# Patient Record
Sex: FEMALE | Race: WHITE | Employment: FULL TIME | URBAN - METROPOLITAN AREA
[De-identification: names, ages, dates, MRNs, and addresses within clinical notes are randomized per-mention and may not be internally consistent; named-entity substitution may affect disease eponyms.]

---

## 2018-11-16 ENCOUNTER — TELEPHONE (OUTPATIENT)
Dept: PHARMACY | Facility: CLINIC | Age: 68
End: 2018-11-16

## 2018-11-16 NOTE — TELEPHONE ENCOUNTER
CLINICAL PHARMACY NOTE - Adherence Review    Identified adherence care gap per [employer] (patient insurance) - per records, appears:   Pravastatin 40mg -  90-day supply last filled 8/1/18    Notes:   Per Reconcile Medication Dispenses in Care Path:   Last filled on 10/18/18 for a 90-day supply    [facility] [address] [facility]:  Last filled/billed on 10/18/18 for a 90-day supply; last picked up on 10/19/18 for a 90ds    No patient outreach planned at this time. Giovanni Calderon  PharmD Candidate 8728 [phone]    I have discussed the care of this patient with the student. I agree with the assessment and plan as documented. Bhaskar McdowellD, Shasta Robles  Clinical Pharmacy Specialist  O: [phone]  C: [address] [place] [phone], Option 7    =======================================================    For Pharmacy Admin Tracking Only  PHSO: Yes  Total # of Interventions Recommended: 1  - New Order #: 0 New Medication Order Reason(s):  Adherence  - Refills Provided #: 0  - Maintenance Safety Lab Monitoring #: 1  - New Therapy Lab Monitoring #: 0  Total Interventions Accepted: 0  Time Spent (min): 15

## 2019-11-27 PROBLEM — C50.911 MALIGNANT NEOPLASM OF RIGHT FEMALE BREAST (HCC): Status: ACTIVE | Noted: 2019-11-27

## 2020-03-10 LAB
ABSOLUTE BASO #: 0 X10E9/L (ref 0–0.9)
ABSOLUTE EOS #: 0.1 X10E9/L (ref 0–0.4)
ABSOLUTE LYMPH #: 1.5 X10E9/L (ref 1–3.5)
ABSOLUTE MONO #: 0.5 X10E9/L (ref 0–0.9)
ABSOLUTE NEUT #: 4 X10E9/L (ref 1.5–6.6)
ALBUMIN SERPL-MCNC: 4 G/DL (ref 3.2–5.3)
ALK PHOSPHATASE: 76 U/L (ref 39–130)
ALT SERPL-CCNC: 8 U/L (ref 0–31)
ANION GAP SERPL CALCULATED.3IONS-SCNC: 8 MMOL/L (ref 5–15)
AST SERPL-CCNC: 10 U/L (ref 0–41)
BASOPHILS RELATIVE PERCENT: 0.4 %
BILIRUB SERPL-MCNC: 0.5 MG/DL (ref 0.3–1.2)
BUN BLDV-MCNC: 9 MG/DL (ref 5–27)
CALCIUM SERPL-MCNC: 9 MG/DL (ref 8.5–10.5)
CHLORIDE BLD-SCNC: 102 MMOL/L (ref 98–109)
CO2: 28 MMOL/L (ref 22–32)
CREAT SERPL-MCNC: 0.74 MG/DL (ref 0.4–1)
EGFR AFRICAN AMERICAN: >60 ML/MIN/1.73SQ.M
EGFR IF NONAFRICAN AMERICAN: >60 ML/MIN/1.73SQ.M
EOSINOPHILS RELATIVE PERCENT: 2 %
GLUCOSE: 271 MG/DL (ref 65–99)
HCT VFR BLD CALC: 35.9 % (ref 35–47)
HEMOGLOBIN: 11.9 G/DL (ref 11.7–16.1)
LYMPHOCYTE %: 24.6 %
MCH RBC QN AUTO: 27.7 PG (ref 27–35)
MCHC RBC AUTO-ENTMCNC: 33.1 G/DL (ref 31–36)
MCV RBC AUTO: 84 FL (ref 81–101)
MONOCYTES # BLD: 8.5 %
NEUTROPHILS RELATIVE PERCENT: 64.5 %
PDW BLD-RTO: 15.8 % (ref 11.5–14.7)
PLATELETS: 283 X10E9/L (ref 150–450)
PMV BLD AUTO: 7.1 FL (ref 7–12)
POTASSIUM SERPL-SCNC: 3.6 MMOL/L (ref 3.5–5)
RBC: 4.29 X10E12/L (ref 3.55–5.2)
SODIUM BLD-SCNC: 138 MMOL/L (ref 134–146)
TOTAL PROTEIN: 6.3 G/DL (ref 6–8)
WBC: 6.2 X10E9/L (ref 4–11.8)

## 2020-10-01 PROBLEM — E11.649 TYPE 2 DIABETES MELLITUS WITH HYPOGLYCEMIA WITHOUT COMA, WITHOUT LONG-TERM CURRENT USE OF INSULIN (HCC): Status: ACTIVE | Noted: 2020-10-01

## 2020-10-01 PROBLEM — F33.42 RECURRENT MAJOR DEPRESSIVE DISORDER, IN FULL REMISSION (HCC): Status: ACTIVE | Noted: 2020-10-01

## 2021-03-30 ENCOUNTER — TELEPHONE (OUTPATIENT)
Dept: PHARMACY | Facility: CLINIC | Age: 71
End: 2021-03-30

## 2021-03-30 NOTE — LETTER
[address]  [address], [address]  Phone: [phone], option 7        Ema Byrne   [address] [facility] [address]           03/31/21     Dear Ema Byrne,    We tried to reach you recently regarding your Pravastatin and Glimperide, but were unable to reach you on the telephone. We have on file that you are currently taking Pravastatin and Glimperide. If you are no longer taking it or taking it differently than above, please call us at the number below so that we can discuss this and update your medication profile.      This medication can be filled for a 3-month supply to save you time and trips to the pharmacy  if you would like assistance in switching your prescriptions to a 3-month supply, please contact us        Sincerely,      [address]  Phone: [phone], option 7

## 2021-03-30 NOTE — TELEPHONE ENCOUNTER
[facility] CLINICAL PHARMACY REVIEW: ADHERENCE REVIEW  Identified care gap per [employer]; fills at [address]: Diabetes and Statin adherence    Last Visit: 3/31/21    Patient also appears to be prescribed: Jardiance 25mg, Glimepiride 2mg, Pravastatin 40mg,     Patient found in Outcomes MTM and is currently eligible for TIP    DIABETES ADHERENCE    Per Insurance Records through [address]    Per Outcomes MTM Records:  Sally Ellington last filled on 3/18/21 for 90 day supply. Per [address]:   Sally Ellington last picked up on 3/18/21 for 90 day supply. 3/22/21 refills remaining. Billed through [place] through [place] ([address] [facility] = 97%; YTD [facility] = 88%):   Jardiance last filled on 1/25/21 for 30 day supply. Next refill due: 3/31/21    Per Outcomes MTM Records:  [place] last filled on 3/1/21 for 30 day supply. Per [address]:   [place] last picked up on 3/1/21 for 30 day supply. 5 refills remaining. Billed through [employer]   Component Value Date    LABA1C 7.5 03/29/2021    LABA1C 7.4 (H) 10/01/2020    LABA1C 7.5 (H) 10/31/2018     NOTE A1c <9%    STATIN ADHERENCE    Per Insurance Records through [place] ([address] [facility] = 60%; YTD PDC = 100%):   Pravastatin last filled on 1/25/21 for 30 day supply. Next refill due: 3/31/21    Per Outcomes MTM Records:  Pravastatin last filled on 3/1/21 for 90 day supply. Per [address]:   Pravastatin last picked up on 3/1/21 for 30 day supply. 0 refills remaining.  Billed through [employer]   Component Value Date    CHOL 206 03/29/2021    TRIG 141 03/29/2021    HDL 40 03/29/2021    LDLCALC 138 03/29/2021     ALT   Date Value Ref Range Status   10/01/2020 10 U/L Final     AST   Date Value Ref Range Status   10/01/2020 15 U/L Final     The 10-year ASCVD risk score (Tiffanie Lazaro., et al., 2013) is: 20.6%    Values used to calculate the score:      Age: 72 years      Sex: Female      Is Non- : No      Diabetic: Yes      Tobacco smoker: No      Systolic Blood Pressure: 661 mmHg      Is BP treated: Yes      HDL Cholesterol: 40 mg/dL      Total Cholesterol: 206 mg/dL     PLAN  The following are interventions that have been identified:   - Patient eligible for 90 day supply of Jaridance    Attempting to reach patient to review changing prescription from a 30-day supply to a 90-day supply.  Left message asking for return call       Future Appointments   Date Time Provider July Velarde   7/29/2021  9:00 AM Marshall Gupta MD [facility] None

## 2021-04-01 NOTE — TELEPHONE ENCOUNTER
Tiffanie Tejas returned call and declined a 90ds for Jardiance. She said \"financially its better to fill once a month\". If a 90ds is cheaper, she'll consider switching to a 90ds      CLINICAL PHARMACY CONSULT: MED RECONCILIATION/REVIEW ADDENDUM    For Pharmacy Admin Tracking Only    PHSO: Yes  Total # of Interventions Recommended: 0  - New Order #: 0 New Medication Order Reason(s):  Adherence  - Maintenance Safety Lab Monitoring #: 1  - New Therapy Lab Monitoring #: 0  Recommended intervention potential cost savings: 0  Total Interventions Accepted: 0  Time Spent (min): Hernando Zimmerman

## 2021-08-19 ENCOUNTER — HOSPITAL ENCOUNTER (EMERGENCY)
Age: 71
Discharge: HOME | End: 2021-08-19
Attending: EMERGENCY MEDICINE
Payer: COMMERCIAL

## 2021-08-19 ENCOUNTER — APPOINTMENT (OUTPATIENT)
Dept: ULTRASOUND IMAGING | Age: 71
End: 2021-08-19
Payer: COMMERCIAL

## 2021-08-19 VITALS
HEART RATE: 80 BPM | SYSTOLIC BLOOD PRESSURE: 131 MMHG | DIASTOLIC BLOOD PRESSURE: 65 MMHG | BODY MASS INDEX: 30.99 KG/M2 | TEMPERATURE: 98.5 F | HEIGHT: 65 IN | WEIGHT: 186 LBS | RESPIRATION RATE: 18 BRPM | OXYGEN SATURATION: 98 %

## 2021-08-19 DIAGNOSIS — M79.89 LEFT LEG SWELLING: Primary | ICD-10-CM

## 2021-08-19 PROCEDURE — 99283 EMERGENCY DEPT VISIT LOW MDM: CPT

## 2021-08-19 PROCEDURE — 93971 EXTREMITY STUDY: CPT

## 2022-01-17 ENCOUNTER — HOSPITAL ENCOUNTER (OUTPATIENT)
Age: 72
Setting detail: SPECIMEN
Discharge: HOME | End: 2022-01-17

## 2022-01-17 ENCOUNTER — HOSPITAL ENCOUNTER (OUTPATIENT)
Dept: GENERAL RADIOLOGY | Facility: CLINIC | Age: 72
Discharge: HOME | End: 2022-01-19
Payer: COMMERCIAL

## 2022-01-17 DIAGNOSIS — E11.9 TYPE 2 DIABETES MELLITUS WITHOUT COMPLICATION, WITHOUT LONG-TERM CURRENT USE OF INSULIN (HCC): ICD-10-CM

## 2022-01-17 DIAGNOSIS — R05.9 COUGH: ICD-10-CM

## 2022-01-17 DIAGNOSIS — R53.83 OTHER FATIGUE: ICD-10-CM

## 2022-01-17 DIAGNOSIS — E78.5 HYPERLIPIDEMIA, UNSPECIFIED HYPERLIPIDEMIA TYPE: ICD-10-CM

## 2022-01-17 LAB
ALT SERPL-CCNC: 5 U/L (ref 5–33)
ANION GAP SERPL CALCULATED.3IONS-SCNC: 15 MMOL/L (ref 9–17)
AST SERPL-CCNC: 10 U/L
BUN BLDV-MCNC: 10 MG/DL (ref 8–23)
BUN/CREAT BLD: ABNORMAL (ref 9–20)
CALCIUM SERPL-MCNC: 9.1 MG/DL (ref 8.6–10.4)
CHLORIDE BLD-SCNC: 101 MMOL/L (ref 98–107)
CHOLESTEROL/HDL RATIO: 4.2
CHOLESTEROL: 171 MG/DL
CO2: 24 MMOL/L (ref 20–31)
CREAT SERPL-MCNC: 0.49 MG/DL (ref 0.5–0.9)
GFR AFRICAN AMERICAN: >60 ML/MIN
GFR NON-AFRICAN AMERICAN: >60 ML/MIN
GFR SERPL CREATININE-BSD FRML MDRD: ABNORMAL ML/MIN/{1.73_M2}
GFR SERPL CREATININE-BSD FRML MDRD: ABNORMAL ML/MIN/{1.73_M2}
GLUCOSE FASTING: 98 MG/DL (ref 70–99)
HDLC SERPL-MCNC: 41 MG/DL
LDL CHOLESTEROL: 107 MG/DL (ref 0–130)
POTASSIUM SERPL-SCNC: 3.8 MMOL/L (ref 3.7–5.3)
SODIUM BLD-SCNC: 140 MMOL/L (ref 135–144)
THYROXINE, FREE: 1.15 NG/DL (ref 0.93–1.7)
TRIGL SERPL-MCNC: 114 MG/DL
TSH SERPL DL<=0.05 MIU/L-ACNC: 3.13 MIU/L (ref 0.3–5)
VITAMIN B-12: 222 PG/ML (ref 232–1245)
VLDLC SERPL CALC-MCNC: NORMAL MG/DL (ref 1–30)

## 2022-01-17 PROCEDURE — 71046 X-RAY EXAM CHEST 2 VIEWS: CPT

## 2022-01-18 LAB
ABSOLUTE EOS #: 0.16 K/UL (ref 0–0.44)
ABSOLUTE IMMATURE GRANULOCYTE: 0.08 K/UL (ref 0–0.3)
ABSOLUTE LYMPH #: 1.42 K/UL (ref 1.1–3.7)
ABSOLUTE MONO #: 0.79 K/UL (ref 0.1–1.2)
BASOPHILS # BLD: 1 % (ref 0–2)
BASOPHILS ABSOLUTE: 0.08 K/UL (ref 0–0.2)
DIFFERENTIAL TYPE: ABNORMAL
EOSINOPHILS RELATIVE PERCENT: 2 % (ref 1–4)
ESTIMATED AVERAGE GLUCOSE: 123 MG/DL
HBA1C MFR BLD: 5.9 % (ref 4–6)
HCT VFR BLD CALC: 29.2 % (ref 36.3–47.1)
HEMOGLOBIN: 8.2 G/DL (ref 11.9–15.1)
IMMATURE GRANULOCYTES: 1 %
LYMPHOCYTES # BLD: 18 % (ref 24–43)
MCH RBC QN AUTO: 24.3 PG (ref 25.2–33.5)
MCHC RBC AUTO-ENTMCNC: 28.1 G/DL (ref 28.4–34.8)
MCV RBC AUTO: 86.4 FL (ref 82.6–102.9)
MONOCYTES # BLD: 10 % (ref 3–12)
MORPHOLOGY: ABNORMAL
NRBC AUTOMATED: 0 PER 100 WBC
PDW BLD-RTO: 18.2 % (ref 11.8–14.4)
PLATELET # BLD: 453 K/UL (ref 138–453)
PLATELET ESTIMATE: ABNORMAL
PMV BLD AUTO: 8.9 FL (ref 8.1–13.5)
RBC # BLD: 3.38 M/UL (ref 3.95–5.11)
RBC # BLD: ABNORMAL 10*6/UL
SEG NEUTROPHILS: 68 % (ref 36–65)
SEGMENTED NEUTROPHILS ABSOLUTE COUNT: 5.37 K/UL (ref 1.5–8.1)
WBC # BLD: 7.9 K/UL (ref 3.5–11.3)
WBC # BLD: ABNORMAL 10*3/UL

## 2022-08-30 ENCOUNTER — TELEPHONE (OUTPATIENT)
Dept: PHARMACY | Facility: CLINIC | Age: 72
End: 2022-08-30

## 2022-08-30 NOTE — TELEPHONE ENCOUNTER
[facility] CLINICAL PHARMACY: ADHERENCE REVIEW  Identified care gap per [employer]: fills at [facility]: Statin adherence    Last Visit: 02.01.22    Patient also appears to be prescribed: Glimepiride & Jardiance       ASSESSMENT  DIABETES ADHERENCE      Per Reconciled Dispense Report:  Glimepiride last filled on 05.20.22 for 90 day supply. Jardiance  last filled on 05.07.22 for 90 day supply. Lab Results   Component Value Date    LABA1C 5.9 01/17/2022    LABA1C 7.2 07/29/2021    LABA1C 7.5 03/29/2021     NOTE A1c <9%    STATIN ADHERENCE    Insurance Records claims through 08.22.22 (Prior Year PDC = PASSED; YTD [facility] =  75%; Potential Fail Date: 09.10.22 ):   Pravastatin last filled on 07.06.22 for 30 day supply. Next refill due: 08.05.22    Per  [place] [facility]:  (same as above). Lab Results   Component Value Date    CHOL 171 01/17/2022    TRIG 114 01/17/2022    HDL 41 01/17/2022    LDLCHOLESTEROL 107 01/17/2022    LDLCALC 138 03/29/2021     ALT   Date Value Ref Range Status   01/17/2022 5 5 - 33 U/L Final     AST   Date Value Ref Range Status   01/17/2022 10 <32 U/L Final     The 10-year ASCVD risk score (Aundra Denver., et al., 2013) is: 21.3%    Values used to calculate the score:      Age: 80 years      Sex: Female      Is Non- : No      Diabetic: Yes      Tobacco smoker: No      Systolic Blood Pressure: 046 mmHg      Is BP treated: Yes      HDL Cholesterol: 41 mg/dL      Total Cholesterol: 171 mg/dL     PLAN  The following are interventions that have been identified:   - Patient overdue refilling Pravastatin, Glimepiride, & Jardiance and active on home medication list.   - Is patient wanting to receive 90 day supply of Pravastatin?   - prescription is written as 90 + 1 on 6/1/22   - prescription was sent to [facility], other meds go to [facility]. Attempting to reach patient to review. Left message asking for return call. No future appointments.     Therese Jackson, [address] [facility] [address]  Phone: toll free [phone]

## 2022-08-30 NOTE — LETTER
[place]  [address], [address]        Gerardo Morales  [address] [place] [address]        08/31/22    Dear Mable Guzman    We tried to reach you recently regarding your Pravastatin, Glimepiride, & Jardiance prescriptions, but were unable to reach you on the telephone. We have on file that you are currently due for refills. If you are no longer taking or taking differently, please call us at the number below so that we can discuss this and update your medication profile. It appears that this medication has not been filled at proper times. We are worried you might be missing doses or not taking it as directed. It is important that you take your medications regularly and try not to miss a single dose. Some ways to help you remember to take and refill your medications are to:  · Use a pill box, set an alarm, and/or keep your medication near something that you do every day  · Fill a 3-month supply of your prescription at a time to save you time and trips to the pharmacy - if you would like assistance in switching your prescriptions to a 3-month supply, please contact us.   · Ask your pharmacy if they participate in [facility]", a program where you can  all of your medications on the same day  · Ask your pharmacy if you can be set up with automatic refill, where they will automatically refill your prescription when it is due and let you know it's ready to     Sincerely,       [address]  Phone: toll free [phone] Option 1

## 2022-09-08 NOTE — TELEPHONE ENCOUNTER
Patient returned call    Does not need refills on pravastatin. Has not been taking regularly and has pills remaining. Has about 2 weeks left of her Glimepiride left. But would like her Glimepiride called into [facility] pharmacy. She provided me with their phone # [phone] her [facility] through [employer] [address]      Was also wanting to know if it was possible that her Hydrochlorothiazide could be called into [facility] for a 90ds?     [facility]

## 2022-09-09 NOTE — TELEPHONE ENCOUNTER
Patient calls stating she no longer uses [address] She is asking to have 90 day supply refills of Glimepiride 2 mg QD and HCTZ 12.5 mg QD sent to [facility] pharmacy (on file). Patient denied needing Pravastatin & Jardiance refills. Will route to PharmD for refill requests.

## 2022-10-24 PROBLEM — E11.9 TYPE 2 DIABETES MELLITUS WITHOUT COMPLICATION, WITHOUT LONG-TERM CURRENT USE OF INSULIN (HCC): Status: ACTIVE | Noted: 2020-10-01

## 2022-10-24 PROBLEM — C16.9 MALIGNANT NEOPLASM OF STOMACH (HCC): Status: ACTIVE | Noted: 2022-10-24

## 2022-12-01 ENCOUNTER — HOSPITAL ENCOUNTER (OUTPATIENT)
Age: 72
Setting detail: SPECIMEN
Discharge: HOME | End: 2022-12-01

## 2022-12-02 DIAGNOSIS — R30.9 PAINFUL URINATION: ICD-10-CM

## 2022-12-04 LAB
CULTURE: ABNORMAL
CULTURE: ABNORMAL
SPECIMEN DESCRIPTION: ABNORMAL

## 2024-03-31 ENCOUNTER — HOSPITAL ENCOUNTER (OUTPATIENT)
Age: 74
Setting detail: OBSERVATION
Discharge: HOME | End: 2024-04-01
Attending: EMERGENCY MEDICINE | Admitting: STUDENT IN AN ORGANIZED HEALTH CARE EDUCATION/TRAINING PROGRAM
Payer: MEDICARE

## 2024-03-31 DIAGNOSIS — C49.A0 MALIGNANT GASTROINTESTINAL STROMAL TUMOR, UNSPECIFIED SITE (HCC): ICD-10-CM

## 2024-03-31 DIAGNOSIS — K52.9 ENTERITIS: Primary | ICD-10-CM

## 2024-03-31 DIAGNOSIS — R11.2 NAUSEA AND VOMITING, UNSPECIFIED VOMITING TYPE: ICD-10-CM

## 2024-03-31 PROCEDURE — 99285 EMERGENCY DEPT VISIT HI MDM: CPT

## 2024-03-31 RX ORDER — 0.9 % SODIUM CHLORIDE 0.9 %
1000 INTRAVENOUS SOLUTION INTRAVENOUS ONCE
Status: COMPLETED | OUTPATIENT
Start: 2024-04-01 | End: 2024-04-01

## 2024-03-31 RX ORDER — ONDANSETRON 2 MG/ML
4 INJECTION INTRAMUSCULAR; INTRAVENOUS ONCE
Status: COMPLETED | OUTPATIENT
Start: 2024-04-01 | End: 2024-04-01

## 2024-03-31 ASSESSMENT — PAIN - FUNCTIONAL ASSESSMENT: PAIN_FUNCTIONAL_ASSESSMENT: 0-10

## 2024-03-31 ASSESSMENT — PAIN DESCRIPTION - PAIN TYPE: TYPE: ACUTE PAIN

## 2024-03-31 ASSESSMENT — PAIN DESCRIPTION - LOCATION: LOCATION: ABDOMEN

## 2024-03-31 ASSESSMENT — PAIN SCALES - GENERAL: PAINLEVEL_OUTOF10: 6

## 2024-04-01 ENCOUNTER — APPOINTMENT (OUTPATIENT)
Dept: CT IMAGING | Age: 74
End: 2024-04-01
Payer: MEDICARE

## 2024-04-01 VITALS
DIASTOLIC BLOOD PRESSURE: 62 MMHG | WEIGHT: 180 LBS | HEIGHT: 64 IN | BODY MASS INDEX: 30.73 KG/M2 | RESPIRATION RATE: 18 BRPM | HEART RATE: 78 BPM | TEMPERATURE: 98.4 F | OXYGEN SATURATION: 95 % | SYSTOLIC BLOOD PRESSURE: 130 MMHG

## 2024-04-01 PROBLEM — R52 INTRACTABLE PAIN: Status: RESOLVED | Noted: 2024-04-01 | Resolved: 2024-04-01

## 2024-04-01 PROBLEM — R52 INTRACTABLE PAIN: Status: ACTIVE | Noted: 2024-04-01

## 2024-04-01 PROBLEM — C49.A0 MALIGNANT GASTROINTESTINAL STROMAL TUMOR (HCC): Status: ACTIVE | Noted: 2022-10-24

## 2024-04-01 LAB
ALBUMIN SERPL-MCNC: 4 G/DL (ref 3.5–5.2)
ALBUMIN/GLOB SERPL: 1.4 {RATIO} (ref 1–2.5)
ALP SERPL-CCNC: 80 U/L (ref 35–104)
ALT SERPL-CCNC: 7 U/L (ref 5–33)
ANION GAP SERPL CALCULATED.3IONS-SCNC: 16 MMOL/L (ref 9–17)
AST SERPL-CCNC: 12 U/L
BASOPHILS # BLD: 0.02 K/UL (ref 0–0.2)
BASOPHILS NFR BLD: 0 % (ref 0–2)
BILIRUB SERPL-MCNC: 0.7 MG/DL (ref 0.3–1.2)
BUN SERPL-MCNC: 10 MG/DL (ref 8–23)
CALCIUM SERPL-MCNC: 8.7 MG/DL (ref 8.6–10.4)
CHLORIDE SERPL-SCNC: 103 MMOL/L (ref 98–107)
CO2 SERPL-SCNC: 22 MMOL/L (ref 20–31)
CREAT SERPL-MCNC: 0.6 MG/DL (ref 0.5–0.9)
EOSINOPHIL # BLD: 0.07 K/UL (ref 0–0.4)
EOSINOPHILS RELATIVE PERCENT: 1 % (ref 1–4)
ERYTHROCYTE [DISTWIDTH] IN BLOOD BY AUTOMATED COUNT: 14.9 % (ref 12.5–15.4)
GFR SERPL CREATININE-BSD FRML MDRD: >90 ML/MIN/1.73M2
GLUCOSE SERPL-MCNC: 194 MG/DL (ref 70–99)
HCT VFR BLD AUTO: 38.9 % (ref 36–46)
HGB BLD-MCNC: 12.7 G/DL (ref 12–16)
LACTATE BLDV-SCNC: 1.1 MMOL/L (ref 0.5–2.2)
LIPASE SERPL-CCNC: 17 U/L (ref 13–60)
LYMPHOCYTES NFR BLD: 0.63 K/UL (ref 1–4.8)
LYMPHOCYTES RELATIVE PERCENT: 6 % (ref 24–44)
MCH RBC QN AUTO: 30.5 PG (ref 26–34)
MCHC RBC AUTO-ENTMCNC: 32.6 G/DL (ref 31–37)
MCV RBC AUTO: 93.3 FL (ref 80–100)
MONOCYTES NFR BLD: 0.69 K/UL (ref 0.1–1.2)
MONOCYTES NFR BLD: 6 % (ref 2–11)
NEUTROPHILS NFR BLD: 87 % (ref 36–66)
NEUTS SEG NFR BLD: 9.51 K/UL (ref 1.8–7.7)
PLATELET # BLD AUTO: 279 K/UL (ref 140–450)
PMV BLD AUTO: 8.5 FL (ref 8–14)
POTASSIUM SERPL-SCNC: 4.1 MMOL/L (ref 3.7–5.3)
PROT SERPL-MCNC: 6.8 G/DL (ref 6.4–8.3)
RBC # BLD AUTO: 4.17 M/UL (ref 4–5.2)
SODIUM SERPL-SCNC: 141 MMOL/L (ref 135–144)
WBC OTHER # BLD: 10.9 K/UL (ref 3.5–11)

## 2024-04-01 PROCEDURE — 96361 HYDRATE IV INFUSION ADD-ON: CPT

## 2024-04-01 PROCEDURE — 85025 COMPLETE CBC W/AUTO DIFF WBC: CPT

## 2024-04-01 PROCEDURE — 6360000004 HC RX CONTRAST MEDICATION: Performed by: EMERGENCY MEDICINE

## 2024-04-01 PROCEDURE — G0378 HOSPITAL OBSERVATION PER HR: HCPCS

## 2024-04-01 PROCEDURE — 99222 1ST HOSP IP/OBS MODERATE 55: CPT | Performed by: STUDENT IN AN ORGANIZED HEALTH CARE EDUCATION/TRAINING PROGRAM

## 2024-04-01 PROCEDURE — 36415 COLL VENOUS BLD VENIPUNCTURE: CPT

## 2024-04-01 PROCEDURE — 83605 ASSAY OF LACTIC ACID: CPT

## 2024-04-01 PROCEDURE — 2580000003 HC RX 258: Performed by: EMERGENCY MEDICINE

## 2024-04-01 PROCEDURE — 83690 ASSAY OF LIPASE: CPT

## 2024-04-01 PROCEDURE — 6360000002 HC RX W HCPCS: Performed by: EMERGENCY MEDICINE

## 2024-04-01 PROCEDURE — 74177 CT ABD & PELVIS W/CONTRAST: CPT

## 2024-04-01 PROCEDURE — 6370000000 HC RX 637 (ALT 250 FOR IP): Performed by: NURSE PRACTITIONER

## 2024-04-01 PROCEDURE — 96360 HYDRATION IV INFUSION INIT: CPT

## 2024-04-01 PROCEDURE — 80053 COMPREHEN METABOLIC PANEL: CPT

## 2024-04-01 RX ORDER — ONDANSETRON 2 MG/ML
4 INJECTION INTRAMUSCULAR; INTRAVENOUS EVERY 6 HOURS PRN
Status: DISCONTINUED | OUTPATIENT
Start: 2024-04-01 | End: 2024-04-01 | Stop reason: HOSPADM

## 2024-04-01 RX ORDER — ACETAMINOPHEN 650 MG/1
650 SUPPOSITORY RECTAL EVERY 6 HOURS PRN
Status: DISCONTINUED | OUTPATIENT
Start: 2024-04-01 | End: 2024-04-01 | Stop reason: HOSPADM

## 2024-04-01 RX ORDER — SODIUM CHLORIDE 9 MG/ML
INJECTION, SOLUTION INTRAVENOUS CONTINUOUS
Status: DISCONTINUED | OUTPATIENT
Start: 2024-04-01 | End: 2024-04-01 | Stop reason: HOSPADM

## 2024-04-01 RX ORDER — RIVAROXABAN 10 MG/1
10 TABLET, FILM COATED ORAL
COMMUNITY

## 2024-04-01 RX ORDER — SODIUM CHLORIDE 0.9 % (FLUSH) 0.9 %
10 SYRINGE (ML) INJECTION PRN
Status: DISCONTINUED | OUTPATIENT
Start: 2024-04-01 | End: 2024-04-01 | Stop reason: HOSPADM

## 2024-04-01 RX ORDER — SODIUM CHLORIDE 0.9 % (FLUSH) 0.9 %
5-40 SYRINGE (ML) INJECTION PRN
Status: DISCONTINUED | OUTPATIENT
Start: 2024-04-01 | End: 2024-04-01 | Stop reason: HOSPADM

## 2024-04-01 RX ORDER — SODIUM CHLORIDE 9 MG/ML
INJECTION, SOLUTION INTRAVENOUS PRN
Status: DISCONTINUED | OUTPATIENT
Start: 2024-04-01 | End: 2024-04-01 | Stop reason: HOSPADM

## 2024-04-01 RX ORDER — DOCUSATE SODIUM 100 MG/1
200 CAPSULE, LIQUID FILLED ORAL 2 TIMES DAILY
Status: DISCONTINUED | OUTPATIENT
Start: 2024-04-01 | End: 2024-04-01 | Stop reason: HOSPADM

## 2024-04-01 RX ORDER — POLYETHYLENE GLYCOL 3350 17 G/17G
17 POWDER, FOR SOLUTION ORAL DAILY PRN
Status: DISCONTINUED | OUTPATIENT
Start: 2024-04-01 | End: 2024-04-01 | Stop reason: HOSPADM

## 2024-04-01 RX ORDER — M-VIT,TX,IRON,MINS/CALC/FOLIC 27MG-0.4MG
1 TABLET ORAL DAILY
COMMUNITY

## 2024-04-01 RX ORDER — 0.9 % SODIUM CHLORIDE 0.9 %
100 INTRAVENOUS SOLUTION INTRAVENOUS ONCE
Status: DISCONTINUED | OUTPATIENT
Start: 2024-04-01 | End: 2024-04-01 | Stop reason: HOSPADM

## 2024-04-01 RX ORDER — ACETAMINOPHEN 325 MG/1
650 TABLET ORAL EVERY 6 HOURS PRN
Status: DISCONTINUED | OUTPATIENT
Start: 2024-04-01 | End: 2024-04-01 | Stop reason: HOSPADM

## 2024-04-01 RX ORDER — ONDANSETRON 4 MG/1
4 TABLET, ORALLY DISINTEGRATING ORAL EVERY 8 HOURS PRN
Status: DISCONTINUED | OUTPATIENT
Start: 2024-04-01 | End: 2024-04-01 | Stop reason: HOSPADM

## 2024-04-01 RX ORDER — SENNA AND DOCUSATE SODIUM 50; 8.6 MG/1; MG/1
2 TABLET, FILM COATED ORAL DAILY
COMMUNITY

## 2024-04-01 RX ORDER — SODIUM CHLORIDE 0.9 % (FLUSH) 0.9 %
5-40 SYRINGE (ML) INJECTION EVERY 12 HOURS SCHEDULED
Status: DISCONTINUED | OUTPATIENT
Start: 2024-04-01 | End: 2024-04-01 | Stop reason: HOSPADM

## 2024-04-01 RX ADMIN — ONDANSETRON 4 MG: 2 INJECTION INTRAMUSCULAR; INTRAVENOUS at 00:00

## 2024-04-01 RX ADMIN — HYDROMORPHONE HYDROCHLORIDE 1 MG: 1 INJECTION, SOLUTION INTRAMUSCULAR; INTRAVENOUS; SUBCUTANEOUS at 02:18

## 2024-04-01 RX ADMIN — SODIUM CHLORIDE: 9 INJECTION, SOLUTION INTRAVENOUS at 02:18

## 2024-04-01 RX ADMIN — Medication 100 ML: at 01:13

## 2024-04-01 RX ADMIN — SODIUM CHLORIDE, PRESERVATIVE FREE 10 ML: 5 INJECTION INTRAVENOUS at 01:13

## 2024-04-01 RX ADMIN — IOPAMIDOL 75 ML: 755 INJECTION, SOLUTION INTRAVENOUS at 01:12

## 2024-04-01 RX ADMIN — DOCUSATE SODIUM 200 MG: 100 CAPSULE, LIQUID FILLED ORAL at 09:55

## 2024-04-01 RX ADMIN — HYDROMORPHONE HYDROCHLORIDE 1 MG: 1 INJECTION, SOLUTION INTRAMUSCULAR; INTRAVENOUS; SUBCUTANEOUS at 00:00

## 2024-04-01 RX ADMIN — SODIUM CHLORIDE 1000 ML: 9 INJECTION, SOLUTION INTRAVENOUS at 00:00

## 2024-04-01 RX ADMIN — SODIUM CHLORIDE: 9 INJECTION, SOLUTION INTRAVENOUS at 09:54

## 2024-04-01 ASSESSMENT — PAIN DESCRIPTION - PAIN TYPE: TYPE: ACUTE PAIN

## 2024-04-01 ASSESSMENT — PAIN SCALES - GENERAL
PAINLEVEL_OUTOF10: 4
PAINLEVEL_OUTOF10: 5
PAINLEVEL_OUTOF10: 8

## 2024-04-01 ASSESSMENT — LIFESTYLE VARIABLES
HOW MANY STANDARD DRINKS CONTAINING ALCOHOL DO YOU HAVE ON A TYPICAL DAY: PATIENT DOES NOT DRINK
HOW OFTEN DO YOU HAVE A DRINK CONTAINING ALCOHOL: NEVER

## 2024-04-01 ASSESSMENT — PAIN DESCRIPTION - LOCATION: LOCATION: ABDOMEN

## 2024-04-01 NOTE — ED PROVIDER NOTES
Encounter   Medications    HYDROmorphone (DILAUDID) injection 1 mg    ondansetron (ZOFRAN) injection 4 mg    sodium chloride 0.9 % bolus 1,000 mL    sodium chloride 0.9 % bolus 100 mL    sodium chloride flush 0.9 % injection 5-40 mL    iopamidol (ISOVUE-370) 76 % injection 75 mL    HYDROmorphone (DILAUDID) injection 1 mg    0.9 % sodium chloride infusion    docusate (COLACE, DULCOLAX) CAPS 2 capsule    sodium chloride flush 0.9 % injection 5-40 mL    sodium chloride flush 0.9 % injection 10 mL    0.9 % sodium chloride infusion    OR Linked Order Group     ondansetron (ZOFRAN-ODT) disintegrating tablet 4 mg     ondansetron (ZOFRAN) injection 4 mg    polyethylene glycol (GLYCOLAX) packet 17 g    OR Linked Order Group     acetaminophen (TYLENOL) tablet 650 mg     acetaminophen (TYLENOL) suppository 650 mg        Vitals:    Vitals:    04/01/24 0045 04/01/24 0101 04/01/24 0108 04/01/24 0218   BP:       Pulse:       Resp:    18   Temp:       TempSrc:       SpO2: 98% 97% 98%    Weight:       Height:         -------------------------  BP: (!) 175/78, Temp: 99.6 °F (37.6 °C), Pulse: 94, Respirations: 18    CONSULTS:  None    CRITICAL CARE:   None    PROCEDURES:  None    DIAGNOSIS/ MDM:   Angelica Guzman is a 73 y.o. female who presents with acute on chronic abdominal pain, nausea and vomiting.  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  CBC, CMP, lipase and lactic acid are unremarkable except for slightly elevated glucose.  CT abdomen pelvis shows fluid-filled small bowel with focal area of wall thickening in the mid abdomen over a length of 10 cm.  Findings are favored to represent inflammatory/infectious enteritis resulting in a mild functional obstruction.  She has multifocal metastatic disease noted throughout the abdomen.  Her pain and nausea improved with IV fluids, Zofran and Dilaudid.  She is still passing flatus.  The patient will need to be admitted for further evaluation, pain control and nausea

## 2024-04-01 NOTE — ACP (ADVANCE CARE PLANNING)
Advance Care Planning     Advance Care Planning Inpatient Note  [facility]    Today's Date: 4/1/2024  Unit: [facility]    Received request from HealthCare Provider.  Upon review of chart and communication with care team, patient's decision making abilities are not in question.. Patient and Child/Children was/were present in the room during visit.    Goals of ACP Conversation:  Discuss advance care planning documents    Health Care Decision Makers:     No healthcare decision makers have been documented.  Click here to complete HealthCare Decision Makers including selection of the Healthcare Decision Maker Relationship (ie \"Primary\")  Summary:  No Decision Maker named by patient at this time    Advance Care Planning Documents (Patient Wishes):  None     Assessment:  This writer met with patient in patient's room. Patient shared with writer that she does not want to complete ACP paperwork at this time. Patient stated that she would like more time to think about it and to talk with her children before completing paperowrk. This writer left blank ACP packet with patient as well as this writer's contact info.     Interventions:  Provided education on documents for clarity and greater understanding  Discussed and provided education on state decision maker hierarchy    Care Preferences Communicated:   No    Outcomes/Plan:  ACP Discussion: Completed    Electronically signed by Chaplain Marj on 4/1/2024 at 11:04 AM

## 2024-04-01 NOTE — DISCHARGE INSTRUCTIONS
Follow up outpatient with PCP and heme-onc.  Patient's CODE STATUS was changed to DNR CCA while inpatient after having detailed discussion with patient and daughter Rebekah.  There was discussion about palliative and hospice.  Continue medication as prescribed.  Patient may benefit from pain management outpatient.

## 2024-04-01 NOTE — H&P
[facility]  Office: [phone]  Mo Scales DO, Pedro Pablo Cadet DO, Doc Garrett DO, Noel Lyle DO, Livan Beltran MD, Mayra Haider MD, Beverly Payton MD, Devorah Robert MD,  Perico Hirsch MD, Emmy Garcia MD, Joe Smith DO, Reza Vora MD,  Geovany Martell DO, Liana Grissom MD, Jacob Rivera MD, Alvaro Scales DO, Chey Rapp MD,  Yan Morales DO, Margot Morales MD, Nichole Nova MD, Jessica Bergman MD, Cheyenne Quiros MD,  Laurent Houser MD, Rosibel Floyd MD, Randal Shea MD, Alhaji Deleon DO, Ladi Del Castillo MD,  Chaitanya Ortega MD, Shirley Waterhouse, CNP,  Gregoria Milner, CNP, Mariam Tomlinson, CNP, Adriano Servin, CNP,  Ching Ott, DNP, Cathy Hickman, CNP, Alicia Hernandez, CNP, Lizzette Ozuna, CNP, Mirna Ceja, CNP, Katie Koenig, CNP, Rafa Headley PA-C, Darling Rodriguez, CNS, Tiana Sweet, CNP, Yaz Das, CNP         [facility]   IN-PATIENT SERVICE   [facility]    HISTORY AND PHYSICAL EXAMINATION            Date:   4/1/2024  Patient name:  Angelica Guzman  Date of admission:  3/31/2024 11:31 PM  MRN:   [id number]  Account:  [id number]  YOB: 1950  PCP:    Zander Valente MD  [facility]  Code Status:    Full Code      History Obtained From:     patient, electronic medical record    History of Present Illness:     Angelica Guzman is a 73 y.o. Unavailable / unknown female who presents with Emesis and Nausea (Today N/V, patient is a cancer patient)   and is admitted to the hospital for the management of Intractable pain.    Patient has past medical history of breast cancer s/p mastectomy, advanced metastatic GIST follows up outpatient with heme-onc, had multiple paracentesis for ascites which is all was negative for malignancy.  Now presented to the ER with intractable pain.  In the ER her vitals were within normal range, lab work was unremarkable and she received pain medications with Dilaudid 1 Mg x 2 and IV

## 2024-04-01 NOTE — DISCHARGE SUMMARY
[facility]  Office: [phone]  Mo Scales DO, Pedro Pablo Cadet DO, Doc Garrett DO, Noel Lyle DO, Livan Beltran MD, Mayra Haider MD, Beverly Payton MD, Devorah Robert MD,  Perico Hirsch MD, Emmy Garcia MD, Joe Smith DO, Reza Vora MD,  Geovany Martell DO, Liana Grissom MD, Jacob Rivera MD, Alvaro Scales DO, Chey Rapp MD,  Yan Morales DO, Margot Morales MD, Nichole Nova MD, Jessica Bergman MD, Cheyenne Quiros MD,  Laurent Houser MD, Rosibel Floyd MD, Randal Shea MD, Alhaji Deleon DO, Ladi Del Castillo MD,  Chaitanya Ortega MD, Shirley Waterhouse, CNP,  Gregoria Milner, CNP, Mariam Tomlinson, CNP, Adriano Servin, CNP,  Ching Ott, DNP, aCthy Hickman, CNP, Alicia Hernandez, CNP, Lizzette Ozuna, CNP, Mirna Ceja, CNP, Katie Koenig, CNP, Rafa Headley PA-C, Darling Rodriguez, CNS, Tiana Sweet, CNP, Yaz Das, CNP         [facility]   IN-PATIENT SERVICE   [facility]    Discharge Summary     Patient ID: Angelica Guzman  :  1950   MRN: [id number]     ACCOUNT:  [id number]   Patient's PCP: Zander Valente MD  Admit Date: 3/31/2024   Discharge Date: 2024  Length of Stay: 0  Code Status:  DNR-CCA  Admitting Physician: Bryce Carpenter MD  Discharge Physician: Bryce Carpenter MD     Active Discharge Diagnoses:     Hospital Problem Lists:  Principal Problem (Resolved):    Intractable pain  Active Problems:    Depression    Essential hypertension    Malignant neoplasm of right female breast (HCC)    Type 2 diabetes mellitus with hypoglycemia without coma, without long-term current use of insulin (HCC)      Admission Condition:  fair     Discharged Condition: good    Hospital Stay:     Hospital Course:  Angelica Guzman is a 73 y.o. female who was admitted for the management of Intractable pain , presented to ER with Emesis and Nausea (Today N/V, patient is a cancer patient)    Patient has past medical history of breast cancer s/p

## 2024-04-01 NOTE — PROGRESS NOTES
Discharge instructions and medications reviewed with the patient and her daughter. No questions at this time. IV removed without complication. Patient's daughter packed up her belongings. Patient discharged with all her belongings via wheelchair. Verbal order for discharge given by Dr. Carpenter.

## 2024-04-02 ENCOUNTER — CARE COORDINATION (OUTPATIENT)
Dept: CASE MANAGEMENT | Age: 74
End: 2024-04-02

## 2024-04-02 NOTE — CARE COORDINATION
Care Transitions Initial Follow Up Call    Call within 2 business days of discharge: Yes      Patient: Angelica Guzman Patient : 1950   MRN: [id number]  Reason for Admission: Enteritis   Discharge Date: 24 RARS: No data recorded    Last Discharge Facility       Date Complaint Diagnosis Description Type Department Provider    3/31/24 Emesis; Nausea Enteritis ... ED to Hosp-Admission (Discharged) (ADMITTED) PB PB Bryce Lawrence MD; Mari Acevedo...            Was this an external facility discharge? No Discharge Facility: [facility]    Challenges to be reviewed by the provider   Additional needs identified to be addressed with provider: Yes  7 day hospital follow up appointment               Method of communication with provider: chart routing.    1st attempt to reach patient for Care Transitions. LMOM requesting return call. Contact information provided.  [phone]      Care Transitions 24 Hour Call    Care Transitions Interventions         Follow Up  No future appointments.        KINSEY CERON RN

## 2024-04-03 ENCOUNTER — CARE COORDINATION (OUTPATIENT)
Dept: CASE MANAGEMENT | Age: 74
End: 2024-04-03

## 2024-04-03 NOTE — CARE COORDINATION
Care Transitions Initial Follow Up Call        Patient: Angelica Guzman Patient : 1950   MRN: [id number]  Reason for Admission: Enteritis  Discharge Date: 24 RARS: No data recorded    Last Discharge Facility       Date Complaint Diagnosis Description Type Department Provider    3/31/24 Emesis; Nausea Enteritis ... ED to Hosp-Admission (Discharged) (ADMITTED) PB PB Bryce Lawrence MD; Mari Acevedo...            Was this an external facility discharge? No Discharge Facility: [facility]    Challenges to be reviewed by the provider   Additional needs identified to be addressed with provider: No  none               Method of communication with provider: none.    Final attempt to reach patient for care transitions. LM requesting return call. Contact information provided. Episode resolved at this time.       Care Transitions 24 Hour Call    Care Transitions Interventions       Follow Up  No future appointments.        KINSEY CERON, RN

## 2024-08-07 ENCOUNTER — ENROLLMENT (OUTPATIENT)
Dept: PHARMACY | Facility: CLINIC | Age: 74
End: 2024-08-07

## 2024-08-19 ENCOUNTER — APPOINTMENT (OUTPATIENT)
Dept: CT IMAGING | Age: 74
End: 2024-08-19
Payer: MEDICARE

## 2024-08-19 ENCOUNTER — HOSPITAL ENCOUNTER (EMERGENCY)
Age: 74
Discharge: HOME | End: 2024-08-19
Attending: EMERGENCY MEDICINE
Payer: MEDICARE

## 2024-08-19 ENCOUNTER — APPOINTMENT (OUTPATIENT)
Dept: GENERAL RADIOLOGY | Age: 74
End: 2024-08-19
Payer: MEDICARE

## 2024-08-19 VITALS
RESPIRATION RATE: 18 BRPM | WEIGHT: 180 LBS | SYSTOLIC BLOOD PRESSURE: 132 MMHG | DIASTOLIC BLOOD PRESSURE: 58 MMHG | TEMPERATURE: 100.4 F | BODY MASS INDEX: 30.73 KG/M2 | HEIGHT: 64 IN | HEART RATE: 83 BPM | OXYGEN SATURATION: 93 %

## 2024-08-19 DIAGNOSIS — J18.9 MULTIFOCAL PNEUMONIA: Primary | ICD-10-CM

## 2024-08-19 LAB
ALBUMIN SERPL-MCNC: 3.2 G/DL (ref 3.5–5.2)
ALBUMIN/GLOB SERPL: 1.3 {RATIO} (ref 1–2.5)
ALP SERPL-CCNC: 75 U/L (ref 35–104)
ALT SERPL-CCNC: <5 U/L (ref 5–33)
ANION GAP SERPL CALCULATED.3IONS-SCNC: 10 MMOL/L (ref 9–17)
AST SERPL-CCNC: 12 U/L
BASOPHILS # BLD: 0.08 K/UL (ref 0–0.2)
BASOPHILS NFR BLD: 1 % (ref 0–2)
BILIRUB SERPL-MCNC: 0.6 MG/DL (ref 0.3–1.2)
BUN SERPL-MCNC: 9 MG/DL (ref 8–23)
CALCIUM SERPL-MCNC: 7.7 MG/DL (ref 8.6–10.4)
CHLORIDE SERPL-SCNC: 99 MMOL/L (ref 98–107)
CO2 SERPL-SCNC: 25 MMOL/L (ref 20–31)
CREAT SERPL-MCNC: 0.5 MG/DL (ref 0.5–0.9)
EOSINOPHIL # BLD: 0 K/UL (ref 0–0.4)
EOSINOPHILS RELATIVE PERCENT: 0 % (ref 1–4)
ERYTHROCYTE [DISTWIDTH] IN BLOOD BY AUTOMATED COUNT: 16.3 % (ref 12.5–15.4)
GFR, ESTIMATED: >90 ML/MIN/1.73M2
GLUCOSE SERPL-MCNC: 150 MG/DL (ref 70–99)
HCT VFR BLD AUTO: 29 % (ref 36–46)
HGB BLD-MCNC: 9.6 G/DL (ref 12–16)
LYMPHOCYTES NFR BLD: 0.45 K/UL (ref 1–4.8)
LYMPHOCYTES RELATIVE PERCENT: 6 % (ref 24–44)
MCH RBC QN AUTO: 29.6 PG (ref 26–34)
MCHC RBC AUTO-ENTMCNC: 33.3 G/DL (ref 31–37)
MCV RBC AUTO: 88.9 FL (ref 80–100)
MONOCYTES NFR BLD: 0.68 K/UL (ref 0.1–1.2)
MONOCYTES NFR BLD: 9 % (ref 2–11)
MORPHOLOGY: NORMAL
NEUTROPHILS NFR BLD: 84 % (ref 36–66)
NEUTS SEG NFR BLD: 6.29 K/UL (ref 1.8–7.7)
PLATELET # BLD AUTO: 198 K/UL (ref 140–450)
PMV BLD AUTO: 6.4 FL (ref 6–12)
POTASSIUM SERPL-SCNC: 3.3 MMOL/L (ref 3.7–5.3)
PROT SERPL-MCNC: 5.7 G/DL (ref 6.4–8.3)
RBC # BLD AUTO: 3.26 M/UL (ref 4–5.2)
SARS-COV-2 RDRP RESP QL NAA+PROBE: NOT DETECTED
SODIUM SERPL-SCNC: 134 MMOL/L (ref 135–144)
SPECIMEN DESCRIPTION: NORMAL
WBC OTHER # BLD: 7.5 K/UL (ref 3.5–11)

## 2024-08-19 PROCEDURE — 6370000000 HC RX 637 (ALT 250 FOR IP): Performed by: PHYSICIAN ASSISTANT

## 2024-08-19 PROCEDURE — 36415 COLL VENOUS BLD VENIPUNCTURE: CPT

## 2024-08-19 PROCEDURE — 71260 CT THORAX DX C+: CPT

## 2024-08-19 PROCEDURE — 99285 EMERGENCY DEPT VISIT HI MDM: CPT

## 2024-08-19 PROCEDURE — 87635 SARS-COV-2 COVID-19 AMP PRB: CPT

## 2024-08-19 PROCEDURE — 6360000004 HC RX CONTRAST MEDICATION: Performed by: PHYSICIAN ASSISTANT

## 2024-08-19 PROCEDURE — 85025 COMPLETE CBC W/AUTO DIFF WBC: CPT

## 2024-08-19 PROCEDURE — 80053 COMPREHEN METABOLIC PANEL: CPT

## 2024-08-19 PROCEDURE — 2580000003 HC RX 258: Performed by: PHYSICIAN ASSISTANT

## 2024-08-19 PROCEDURE — 71045 X-RAY EXAM CHEST 1 VIEW: CPT

## 2024-08-19 RX ORDER — AMOXICILLIN 250 MG/1
1000 CAPSULE ORAL ONCE
Status: COMPLETED | OUTPATIENT
Start: 2024-08-19 | End: 2024-08-19

## 2024-08-19 RX ORDER — ACETAMINOPHEN 500 MG
1000 TABLET ORAL ONCE
Status: COMPLETED | OUTPATIENT
Start: 2024-08-19 | End: 2024-08-19

## 2024-08-19 RX ORDER — IBUPROFEN 800 MG/1
800 TABLET ORAL ONCE
Status: COMPLETED | OUTPATIENT
Start: 2024-08-19 | End: 2024-08-19

## 2024-08-19 RX ORDER — 0.9 % SODIUM CHLORIDE 0.9 %
80 INTRAVENOUS SOLUTION INTRAVENOUS ONCE
Status: DISCONTINUED | OUTPATIENT
Start: 2024-08-19 | End: 2024-08-19 | Stop reason: HOSPADM

## 2024-08-19 RX ORDER — AMOXICILLIN 500 MG/1
1000 CAPSULE ORAL 3 TIMES DAILY
Qty: 30 CAPSULE | Refills: 0 | Status: SHIPPED | OUTPATIENT
Start: 2024-08-19 | End: 2024-08-20

## 2024-08-19 RX ORDER — AZITHROMYCIN 250 MG/1
500 TABLET, FILM COATED ORAL ONCE
Status: COMPLETED | OUTPATIENT
Start: 2024-08-19 | End: 2024-08-19

## 2024-08-19 RX ORDER — AZITHROMYCIN 250 MG/1
250 TABLET, FILM COATED ORAL DAILY
Qty: 4 TABLET | Refills: 0 | Status: SHIPPED | OUTPATIENT
Start: 2024-08-19 | End: 2024-08-20

## 2024-08-19 RX ORDER — ONDANSETRON 4 MG/1
4 TABLET, ORALLY DISINTEGRATING ORAL EVERY 8 HOURS PRN
Status: DISCONTINUED | OUTPATIENT
Start: 2024-08-19 | End: 2024-08-19 | Stop reason: HOSPADM

## 2024-08-19 RX ORDER — SODIUM CHLORIDE 0.9 % (FLUSH) 0.9 %
10 SYRINGE (ML) INJECTION PRN
Status: DISCONTINUED | OUTPATIENT
Start: 2024-08-19 | End: 2024-08-19 | Stop reason: HOSPADM

## 2024-08-19 RX ORDER — IBUPROFEN 800 MG/1
800 TABLET ORAL EVERY 8 HOURS PRN
Qty: 30 TABLET | Refills: 0 | Status: SHIPPED | OUTPATIENT
Start: 2024-08-19 | End: 2024-08-20

## 2024-08-19 RX ADMIN — ONDANSETRON 4 MG: 4 TABLET, ORALLY DISINTEGRATING ORAL at 14:00

## 2024-08-19 RX ADMIN — ACETAMINOPHEN 1000 MG: 500 TABLET ORAL at 14:00

## 2024-08-19 RX ADMIN — Medication 80 ML: at 16:16

## 2024-08-19 RX ADMIN — AMOXICILLIN 1000 MG: 250 CAPSULE ORAL at 17:49

## 2024-08-19 RX ADMIN — IBUPROFEN 800 MG: 800 TABLET ORAL at 14:00

## 2024-08-19 RX ADMIN — SODIUM CHLORIDE, PRESERVATIVE FREE 10 ML: 5 INJECTION INTRAVENOUS at 16:15

## 2024-08-19 RX ADMIN — IOPAMIDOL 75 ML: 755 INJECTION, SOLUTION INTRAVENOUS at 16:15

## 2024-08-19 RX ADMIN — AZITHROMYCIN DIHYDRATE 500 MG: 250 TABLET, FILM COATED ORAL at 17:49

## 2024-08-19 ASSESSMENT — PAIN SCALES - GENERAL
PAINLEVEL_OUTOF10: 6
PAINLEVEL_OUTOF10: 8

## 2024-08-19 ASSESSMENT — ENCOUNTER SYMPTOMS
SORE THROAT: 0
COUGH: 1
BACK PAIN: 0
WHEEZING: 0
VOMITING: 0
COLOR CHANGE: 0
EYE PAIN: 0
RHINORRHEA: 0
EYE DISCHARGE: 0
EYE ITCHING: 0
NAUSEA: 0

## 2024-08-19 ASSESSMENT — PAIN - FUNCTIONAL ASSESSMENT: PAIN_FUNCTIONAL_ASSESSMENT: 0-10

## 2024-08-19 NOTE — ED PROVIDER NOTES
EMERGENCY DEPARTMENT ENCOUNTER    Pt Name: Angelica Guzman  MRN: [id number]  Birthdate 1950  Date of evaluation: 8/19/24  CHIEF COMPLAINT       Chief Complaint   Patient presents with    Cough    Headache    Nausea     HISTORY OF PRESENT ILLNESS   Patient is a 73-year-old male who presents with her daughter for evaluation of a headache fever cough.  Patient 3 days ago.  Has been staying about the same.  Cough is nonproductive.  She did take some Motrin yesterday for subjective fever, reports that she has not felt febrile today.  Has had some slight nausea, no vomiting.  Has not taken a COVID test.  She denies any shortness of breath or chest pain.  No sore throat or ear pain.             REVIEW OF SYSTEMS     Review of Systems   Constitutional:  Positive for chills and fever.   HENT:  Negative for ear pain, rhinorrhea and sore throat.    Eyes:  Negative for pain, discharge and itching.   Respiratory:  Positive for cough. Negative for wheezing.    Cardiovascular:  Negative for chest pain and palpitations.   Gastrointestinal:  Negative for nausea and vomiting.   Genitourinary:  Negative for difficulty urinating and dysuria.   Musculoskeletal:  Positive for myalgias. Negative for back pain.   Skin:  Negative for color change and wound.   Neurological:  Positive for headaches. Negative for dizziness.   Psychiatric/Behavioral:  Negative for dysphoric mood.      PASTMEDICAL HISTORY     Past Medical History:   Diagnosis Date    Breast cancer (HCC)     Depression 05/01/2015    HTN (hypertension) 05/01/2015    Hypercholesterolemia 05/01/2015    Malignant GIST (gastrointestinal stromal tumor) ([facility])     Type II or unspecified type diabetes mellitus without mention of complication, not stated as uncontrolled      Past Problem List  Patient Active Problem List   Diagnosis Code    Hypercholesterolemia E78.00    Depression F32.A    Arthritis M19.90    Essential hypertension I10    Malignant neoplasm of right female breast (HCC)

## 2024-08-19 NOTE — ED PROVIDER NOTES
[facility] EMERGENCY DEPARTMENT  eMERGENCY dEPARTMENT eNCOUnter   Independent Attestation     Pt Name: Angelica Guzman  MRN: [id number]  Birthdate 1950  Date of evaluation: 8/19/24       Angelica Guzman is a 73 y.o. female who presents with Cough, Headache, and Nausea        Based on the medical record, the care appears appropriate. I was personally available for consultation in the Emergency Department.    Chuy Arriaga DO  Attending Emergency  Physician                Chuy Arriaga DO  08/19/24 1506

## 2024-08-19 NOTE — ED TRIAGE NOTES
Patient has been experiencing nausea, headache, body aches, fatigue, fever and a cough since Friday without relief. Patient does currently have a GIST tumor and is undergoing oral Chemotherapy.

## 2024-08-19 NOTE — DISCHARGE INSTRUCTIONS
Please fill, take and complete the antibiotics    Please take Motrin every 8 hours as needed for fever, you may add in 1000 mg of ibuprofen to help with bodyaches and/or headache    Please call your primary care physician's office today to schedule follow-up for 1 to 3 days for recheck of your symptoms    Return to the emergency department for worsening symptoms fever not responding to Tylenol or Motrin shortness of breath or any other emergent concerns

## 2024-08-20 RX ORDER — IBUPROFEN 800 MG/1
800 TABLET ORAL EVERY 8 HOURS PRN
Qty: 30 TABLET | Refills: 0 | Status: SHIPPED | OUTPATIENT
Start: 2024-08-20

## 2024-08-20 RX ORDER — AMOXICILLIN 500 MG/1
1000 CAPSULE ORAL 3 TIMES DAILY
Qty: 30 CAPSULE | Refills: 0 | Status: SHIPPED | OUTPATIENT
Start: 2024-08-20 | End: 2024-08-25

## 2024-08-20 RX ORDER — AZITHROMYCIN 250 MG/1
250 TABLET, FILM COATED ORAL DAILY
Qty: 4 TABLET | Refills: 0 | Status: SHIPPED | OUTPATIENT
Start: 2024-08-20 | End: 2024-08-24

## 2024-09-01 PROBLEM — I71.20 THORACIC AORTIC ANEURYSM (TAA) (HCC): Status: ACTIVE | Noted: 2024-09-01

## 2024-12-21 ENCOUNTER — HOSPITAL ENCOUNTER (INPATIENT)
Age: 74
LOS: 3 days | Discharge: HOME | End: 2024-12-24
Attending: EMERGENCY MEDICINE | Admitting: STUDENT IN AN ORGANIZED HEALTH CARE EDUCATION/TRAINING PROGRAM
Payer: MEDICARE

## 2024-12-21 ENCOUNTER — APPOINTMENT (OUTPATIENT)
Dept: CT IMAGING | Age: 74
End: 2024-12-21
Payer: MEDICARE

## 2024-12-21 DIAGNOSIS — K56.609 SBO (SMALL BOWEL OBSTRUCTION) (HCC): Primary | ICD-10-CM

## 2024-12-21 LAB
ALBUMIN SERPL-MCNC: 3.7 G/DL (ref 3.5–5.2)
ALBUMIN/GLOB SERPL: 1.3 {RATIO} (ref 1–2.5)
ALP SERPL-CCNC: 89 U/L (ref 35–104)
ALT SERPL-CCNC: 6 U/L (ref 5–33)
ANION GAP SERPL CALCULATED.3IONS-SCNC: 13 MMOL/L (ref 9–17)
AST SERPL-CCNC: 12 U/L
BACTERIA URNS QL MICRO: ABNORMAL
BASOPHILS # BLD: 0 K/UL (ref 0–0.2)
BASOPHILS NFR BLD: 0 % (ref 0–2)
BILIRUB DIRECT SERPL-MCNC: 0.2 MG/DL
BILIRUB INDIRECT SERPL-MCNC: 0.4 MG/DL (ref 0–1)
BILIRUB SERPL-MCNC: 0.6 MG/DL (ref 0.3–1.2)
BILIRUB UR QL STRIP: NEGATIVE
BUN SERPL-MCNC: 14 MG/DL (ref 8–23)
CALCIUM SERPL-MCNC: 8.6 MG/DL (ref 8.6–10.4)
CHLORIDE SERPL-SCNC: 103 MMOL/L (ref 98–107)
CLARITY UR: CLEAR
CO2 SERPL-SCNC: 23 MMOL/L (ref 20–31)
COLOR UR: YELLOW
CREAT SERPL-MCNC: 0.6 MG/DL (ref 0.5–0.9)
EOSINOPHIL # BLD: 0.1 K/UL (ref 0–0.4)
EOSINOPHILS RELATIVE PERCENT: 1 % (ref 1–4)
EPI CELLS #/AREA URNS HPF: ABNORMAL /HPF (ref 0–5)
ERYTHROCYTE [DISTWIDTH] IN BLOOD BY AUTOMATED COUNT: 16.5 % (ref 12.5–15.4)
FLUAV AG SPEC QL: NEGATIVE
FLUBV AG SPEC QL: NEGATIVE
GFR, ESTIMATED: >90 ML/MIN/1.73M2
GLUCOSE BLD-MCNC: 145 MG/DL (ref 65–105)
GLUCOSE SERPL-MCNC: 175 MG/DL (ref 70–99)
GLUCOSE UR STRIP-MCNC: ABNORMAL MG/DL
HCT VFR BLD AUTO: 36.5 % (ref 36–46)
HGB BLD-MCNC: 11.9 G/DL (ref 12–16)
HGB UR QL STRIP.AUTO: ABNORMAL
KETONES UR STRIP-MCNC: ABNORMAL MG/DL
LEUKOCYTE ESTERASE UR QL STRIP: NEGATIVE
LYMPHOCYTES NFR BLD: 0.1 K/UL (ref 1–4.8)
LYMPHOCYTES RELATIVE PERCENT: 1 % (ref 24–44)
MCH RBC QN AUTO: 28.9 PG (ref 26–34)
MCHC RBC AUTO-ENTMCNC: 32.6 G/DL (ref 31–37)
MCV RBC AUTO: 88.7 FL (ref 80–100)
MONOCYTES NFR BLD: 0.21 K/UL (ref 0.1–0.8)
MONOCYTES NFR BLD: 2 % (ref 1–7)
MORPHOLOGY: NORMAL
NEUTROPHILS NFR BLD: 96 % (ref 36–66)
NEUTS SEG NFR BLD: 9.99 K/UL (ref 1.8–7.7)
NITRITE UR QL STRIP: NEGATIVE
PH UR STRIP: 6 [PH] (ref 5–8)
PLATELET # BLD AUTO: 323 K/UL (ref 140–450)
PMV BLD AUTO: 6.5 FL (ref 6–12)
POTASSIUM SERPL-SCNC: 3.8 MMOL/L (ref 3.7–5.3)
PROT SERPL-MCNC: 6.5 G/DL (ref 6.4–8.3)
PROT UR STRIP-MCNC: NEGATIVE MG/DL
RBC # BLD AUTO: 4.12 M/UL (ref 4–5.2)
RBC #/AREA URNS HPF: ABNORMAL /HPF (ref 0–2)
SARS-COV-2 RDRP RESP QL NAA+PROBE: NOT DETECTED
SODIUM SERPL-SCNC: 139 MMOL/L (ref 135–144)
SP GR UR STRIP: 1.01 (ref 1–1.03)
SPECIMEN DESCRIPTION: NORMAL
TROPONIN I SERPL HS-MCNC: 18 NG/L (ref 0–14)
TROPONIN I SERPL HS-MCNC: 19 NG/L (ref 0–14)
UROBILINOGEN UR STRIP-ACNC: NORMAL EU/DL (ref 0–1)
WBC #/AREA URNS HPF: ABNORMAL /HPF (ref 0–5)
WBC OTHER # BLD: 10.4 K/UL (ref 3.5–11)

## 2024-12-21 PROCEDURE — 99222 1ST HOSP IP/OBS MODERATE 55: CPT | Performed by: NURSE PRACTITIONER

## 2024-12-21 PROCEDURE — 99285 EMERGENCY DEPT VISIT HI MDM: CPT

## 2024-12-21 PROCEDURE — 87635 SARS-COV-2 COVID-19 AMP PRB: CPT

## 2024-12-21 PROCEDURE — 6360000002 HC RX W HCPCS

## 2024-12-21 PROCEDURE — 1210000000 HC MED SURG R&B

## 2024-12-21 PROCEDURE — 85025 COMPLETE CBC W/AUTO DIFF WBC: CPT

## 2024-12-21 PROCEDURE — 2500000003 HC RX 250 WO HCPCS

## 2024-12-21 PROCEDURE — 36415 COLL VENOUS BLD VENIPUNCTURE: CPT

## 2024-12-21 PROCEDURE — 81001 URINALYSIS AUTO W/SCOPE: CPT

## 2024-12-21 PROCEDURE — 6360000002 HC RX W HCPCS: Performed by: NURSE PRACTITIONER

## 2024-12-21 PROCEDURE — 71260 CT THORAX DX C+: CPT

## 2024-12-21 PROCEDURE — 84484 ASSAY OF TROPONIN QUANT: CPT

## 2024-12-21 PROCEDURE — 2580000003 HC RX 258

## 2024-12-21 PROCEDURE — 2580000003 HC RX 258: Performed by: NURSE PRACTITIONER

## 2024-12-21 PROCEDURE — 87804 INFLUENZA ASSAY W/OPTIC: CPT

## 2024-12-21 PROCEDURE — 6360000004 HC RX CONTRAST MEDICATION

## 2024-12-21 PROCEDURE — 80048 BASIC METABOLIC PNL TOTAL CA: CPT

## 2024-12-21 PROCEDURE — 93005 ELECTROCARDIOGRAM TRACING: CPT | Performed by: EMERGENCY MEDICINE

## 2024-12-21 PROCEDURE — 96374 THER/PROPH/DIAG INJ IV PUSH: CPT

## 2024-12-21 PROCEDURE — 80076 HEPATIC FUNCTION PANEL: CPT

## 2024-12-21 PROCEDURE — 96376 TX/PRO/DX INJ SAME DRUG ADON: CPT

## 2024-12-21 PROCEDURE — 96375 TX/PRO/DX INJ NEW DRUG ADDON: CPT

## 2024-12-21 PROCEDURE — 82947 ASSAY GLUCOSE BLOOD QUANT: CPT

## 2024-12-21 RX ORDER — DEXTROSE MONOHYDRATE 100 MG/ML
INJECTION, SOLUTION INTRAVENOUS CONTINUOUS PRN
Status: DISCONTINUED | OUTPATIENT
Start: 2024-12-21 | End: 2024-12-24 | Stop reason: HOSPADM

## 2024-12-21 RX ORDER — GLUCAGON 1 MG/ML
1 KIT INJECTION PRN
Status: DISCONTINUED | OUTPATIENT
Start: 2024-12-21 | End: 2024-12-24 | Stop reason: HOSPADM

## 2024-12-21 RX ORDER — POTASSIUM CHLORIDE 1500 MG/1
40 TABLET, EXTENDED RELEASE ORAL PRN
Status: DISCONTINUED | OUTPATIENT
Start: 2024-12-21 | End: 2024-12-24 | Stop reason: HOSPADM

## 2024-12-21 RX ORDER — 0.9 % SODIUM CHLORIDE 0.9 %
80 INTRAVENOUS SOLUTION INTRAVENOUS ONCE
Status: DISCONTINUED | OUTPATIENT
Start: 2024-12-21 | End: 2024-12-24 | Stop reason: HOSPADM

## 2024-12-21 RX ORDER — ONDANSETRON 2 MG/ML
4 INJECTION INTRAMUSCULAR; INTRAVENOUS EVERY 6 HOURS PRN
Status: DISCONTINUED | OUTPATIENT
Start: 2024-12-21 | End: 2024-12-24 | Stop reason: HOSPADM

## 2024-12-21 RX ORDER — SODIUM CHLORIDE 0.9 % (FLUSH) 0.9 %
5-40 SYRINGE (ML) INJECTION EVERY 12 HOURS SCHEDULED
Status: DISCONTINUED | OUTPATIENT
Start: 2024-12-21 | End: 2024-12-24 | Stop reason: HOSPADM

## 2024-12-21 RX ORDER — POTASSIUM CHLORIDE 7.45 MG/ML
10 INJECTION INTRAVENOUS PRN
Status: DISCONTINUED | OUTPATIENT
Start: 2024-12-21 | End: 2024-12-24 | Stop reason: HOSPADM

## 2024-12-21 RX ORDER — SODIUM CHLORIDE 0.9 % (FLUSH) 0.9 %
10 SYRINGE (ML) INJECTION ONCE
Status: COMPLETED | OUTPATIENT
Start: 2024-12-21 | End: 2024-12-21

## 2024-12-21 RX ORDER — ACETAMINOPHEN 325 MG/1
650 TABLET ORAL EVERY 6 HOURS PRN
Status: DISCONTINUED | OUTPATIENT
Start: 2024-12-21 | End: 2024-12-24 | Stop reason: HOSPADM

## 2024-12-21 RX ORDER — MAGNESIUM SULFATE IN WATER 40 MG/ML
2000 INJECTION, SOLUTION INTRAVENOUS PRN
Status: DISCONTINUED | OUTPATIENT
Start: 2024-12-21 | End: 2024-12-24 | Stop reason: HOSPADM

## 2024-12-21 RX ORDER — ONDANSETRON 4 MG/1
4 TABLET, ORALLY DISINTEGRATING ORAL EVERY 8 HOURS PRN
Status: DISCONTINUED | OUTPATIENT
Start: 2024-12-21 | End: 2024-12-24 | Stop reason: HOSPADM

## 2024-12-21 RX ORDER — SODIUM CHLORIDE 9 MG/ML
INJECTION, SOLUTION INTRAVENOUS PRN
Status: DISCONTINUED | OUTPATIENT
Start: 2024-12-21 | End: 2024-12-24 | Stop reason: HOSPADM

## 2024-12-21 RX ORDER — ENOXAPARIN SODIUM 100 MG/ML
40 INJECTION SUBCUTANEOUS DAILY
Status: DISCONTINUED | OUTPATIENT
Start: 2024-12-22 | End: 2024-12-24 | Stop reason: HOSPADM

## 2024-12-21 RX ORDER — FENTANYL CITRATE 50 UG/ML
50 INJECTION, SOLUTION INTRAMUSCULAR; INTRAVENOUS EVERY 4 HOURS PRN
Status: DISCONTINUED | OUTPATIENT
Start: 2024-12-21 | End: 2024-12-24 | Stop reason: HOSPADM

## 2024-12-21 RX ORDER — ONDANSETRON 2 MG/ML
4 INJECTION INTRAMUSCULAR; INTRAVENOUS ONCE
Status: COMPLETED | OUTPATIENT
Start: 2024-12-21 | End: 2024-12-21

## 2024-12-21 RX ORDER — SODIUM CHLORIDE 0.9 % (FLUSH) 0.9 %
10 SYRINGE (ML) INJECTION PRN
Status: DISCONTINUED | OUTPATIENT
Start: 2024-12-21 | End: 2024-12-24 | Stop reason: HOSPADM

## 2024-12-21 RX ORDER — SODIUM CHLORIDE 9 MG/ML
INJECTION, SOLUTION INTRAVENOUS CONTINUOUS
Status: DISCONTINUED | OUTPATIENT
Start: 2024-12-21 | End: 2024-12-23

## 2024-12-21 RX ORDER — POLYETHYLENE GLYCOL 3350 17 G/17G
17 POWDER, FOR SOLUTION ORAL DAILY PRN
Status: DISCONTINUED | OUTPATIENT
Start: 2024-12-21 | End: 2024-12-24 | Stop reason: HOSPADM

## 2024-12-21 RX ORDER — IOPAMIDOL 755 MG/ML
75 INJECTION, SOLUTION INTRAVASCULAR
Status: COMPLETED | OUTPATIENT
Start: 2024-12-21 | End: 2024-12-21

## 2024-12-21 RX ORDER — FENTANYL CITRATE 50 UG/ML
50 INJECTION, SOLUTION INTRAMUSCULAR; INTRAVENOUS ONCE
Status: COMPLETED | OUTPATIENT
Start: 2024-12-21 | End: 2024-12-21

## 2024-12-21 RX ORDER — ACETAMINOPHEN 650 MG/1
650 SUPPOSITORY RECTAL EVERY 6 HOURS PRN
Status: DISCONTINUED | OUTPATIENT
Start: 2024-12-21 | End: 2024-12-24 | Stop reason: HOSPADM

## 2024-12-21 RX ORDER — INSULIN LISPRO 100 [IU]/ML
0-4 INJECTION, SOLUTION INTRAVENOUS; SUBCUTANEOUS EVERY 6 HOURS SCHEDULED
Status: DISCONTINUED | OUTPATIENT
Start: 2024-12-22 | End: 2024-12-24

## 2024-12-21 RX ORDER — 0.9 % SODIUM CHLORIDE 0.9 %
1000 INTRAVENOUS SOLUTION INTRAVENOUS ONCE
Status: COMPLETED | OUTPATIENT
Start: 2024-12-21 | End: 2024-12-21

## 2024-12-21 RX ADMIN — IOPAMIDOL 75 ML: 755 INJECTION, SOLUTION INTRAVENOUS at 19:20

## 2024-12-21 RX ADMIN — Medication 80 ML: at 19:21

## 2024-12-21 RX ADMIN — SODIUM CHLORIDE: 9 INJECTION, SOLUTION INTRAVENOUS at 22:00

## 2024-12-21 RX ADMIN — SODIUM CHLORIDE 1000 ML: 9 INJECTION, SOLUTION INTRAVENOUS at 18:12

## 2024-12-21 RX ADMIN — ONDANSETRON 4 MG: 2 INJECTION INTRAMUSCULAR; INTRAVENOUS at 20:04

## 2024-12-21 RX ADMIN — FENTANYL CITRATE 50 MCG: 50 INJECTION, SOLUTION INTRAMUSCULAR; INTRAVENOUS at 22:41

## 2024-12-21 RX ADMIN — FENTANYL CITRATE 50 MCG: 50 INJECTION, SOLUTION INTRAMUSCULAR; INTRAVENOUS at 18:16

## 2024-12-21 RX ADMIN — SODIUM CHLORIDE, PRESERVATIVE FREE 10 ML: 5 INJECTION INTRAVENOUS at 19:20

## 2024-12-21 RX ADMIN — FAMOTIDINE 20 MG: 10 INJECTION, SOLUTION INTRAVENOUS at 20:04

## 2024-12-21 RX ADMIN — ONDANSETRON 4 MG: 2 INJECTION INTRAMUSCULAR; INTRAVENOUS at 18:13

## 2024-12-21 ASSESSMENT — PAIN SCALES - GENERAL
PAINLEVEL_OUTOF10: 10
PAINLEVEL_OUTOF10: 4
PAINLEVEL_OUTOF10: 8

## 2024-12-21 ASSESSMENT — PAIN - FUNCTIONAL ASSESSMENT: PAIN_FUNCTIONAL_ASSESSMENT: 0-10

## 2024-12-21 ASSESSMENT — PAIN DESCRIPTION - LOCATION: LOCATION: ABDOMEN

## 2024-12-21 NOTE — ED PROVIDER NOTES
1.8 - 7.7 k/uL    Lymphocytes Absolute 0.60 (L) 1.0 - 4.8 k/uL    Monocytes Absolute 0.60 0.1 - 1.2 k/uL    Eosinophils Absolute 0.20 0.0 - 0.4 k/uL    Basophils Absolute 0.00 0.0 - 0.2 k/uL   POC Glucose Fingerstick   Result Value Ref Range    POC Glucose 145 (H) 65 - 105 mg/dL   POC Glucose Fingerstick   Result Value Ref Range    POC Glucose 95 65 - 105 mg/dL   POC Glucose Fingerstick   Result Value Ref Range    POC Glucose 80 65 - 105 mg/dL   POC Glucose Fingerstick   Result Value Ref Range    POC Glucose 79 65 - 105 mg/dL   POC Glucose Fingerstick   Result Value Ref Range    POC Glucose 70 65 - 105 mg/dL   POC Glucose Fingerstick   Result Value Ref Range    POC Glucose 67 65 - 105 mg/dL   POC Glucose Fingerstick   Result Value Ref Range    POC Glucose 108 (H) 65 - 105 mg/dL   EKG 12 Lead   Result Value Ref Range    Ventricular Rate 80 BPM    Atrial Rate 80 BPM    P-R Interval 146 ms    QRS Duration 80 ms    Q-T Interval 422 ms    QTc Calculation (Bazett) 486 ms    P Axis 21 degrees    R Axis 4 degrees    T Axis 28 degrees       EMERGENCY DEPARTMENT COURSE     ED Course as of 12/23/24 1005   Sat Dec 21, 2024   1913 Troponin, High Sensitivity(!): 19  Repeat troponin ordered. [AJ]   1913 CBC with Auto Differential(!):    WBC 10.4   RBC 4.12   Hemoglobin Quant 11.9(!)   Hematocrit 36.5   MCV 88.7   MCH 28.9   MCHC 32.6   RDW 16.5(!)   Platelet Count 323   MPV 6.5   Neutrophils % PENDING   Lymphocyte % PENDING   Monocytes % PENDING   Eosinophils % PENDING   Basophils % PENDING   Neutrophils Absolute PENDING   Lymphocytes Absolute PENDING   Monocytes Absolute PENDING   Eosinophils Absolute PENDING   Basophils Absolute PENDING [AJ]   1913 BMP(!):    Sodium 139   Potassium 3.8   Chloride 103   CARBON DIOXIDE 23   Anion Gap 13   Glucose 175(!)   BUN,BUNPL 14   Creatinine 0.6   Est, Glom Filt Rate >90   Calcium 8.6 [AJ]   1913 SARS-CoV-2, Rapid: Not Detected [AJ]   1913 Flu B Antigen: NEGATIVE [AJ]   1913 Flu A Antigen:  NEGATIVE [AJ]   1958 Patient feeling better per ED attending physician. CT scans, UA and repeat troponin pending. [AJ]   2105 Consult placed to internal medicine for admission.  Patient accepted for admission by Mariam Tomlinson NP.  Patient updated on plan.  Placed NPO.  Still feels much better, denies any need for additional pain medication at this time. [AJ]      ED Course User Index  [AJ] Nikkie Myers APRN - CNP        Vitals:    Vitals:    12/23/24 0214 12/23/24 0547 12/23/24 0615 12/23/24 0731   BP:    (!) 147/64   Pulse:    75   Resp: 16  16 16   Temp:    97.8 °F (36.6 °C)   TempSrc:    Oral   SpO2:    96%   Weight:  87 kg (191 lb 12.8 oz)     Height:         -------------------------  BP: (!) 147/64, Temp: 97.8 °F (36.6 °C), Pulse: 75, Respirations: 16      RE-EVALUATION:  See ED Course notes above.    DISCHARGE PLANNING:    Admission    This patient was seen by the attending physician and they agreed with the assessment and plan.    CONSULTS:  IP CONSULT TO GENERAL SURGERY  IP CONSULT TO INTERNAL MEDICINE  IP CONSULT TO ONCOLOGY    PROCEDURES:  None    FINAL IMPRESSION      1. SBO (small bowel obstruction) (HCC)          DISPOSITION / PLAN     CONDITION ON DISPOSITION:       PATIENT REFERRED TO:  No follow-up provider specified.    DISCHARGE MEDICATIONS:  Current Discharge Medication List          KARYN Gross CNP   Emergency Medicine Nurse Practitioner    (Please note that portions of this note were completed with a voice recognition program.  Efforts were made to edit the dictations but occasionally words aremis-transcribed.)        Nikkie Myers APRN - CNP  12/23/24 1008

## 2024-12-21 NOTE — ED PROVIDER NOTES
[facility] Emergency Department  [address]  [place] [facility] [address]  Phone: [phone]  Fax: [phone]      Attending Physician Attestation    I performed a history and physical examination of the patient and discussed management with the mid level provider. I reviewed the mid level provider's note and agree with the documented findings and plan of care. Any areas of disagreement are noted on the chart. I was personally present for the key portions of any procedures. I have documented in the chart those procedures where I was not present during the key portions. I have reviewed the emergency nurses triage note. I agree with the chief complaint, past medical history, past surgical history, allergies, medications, social and family history as documented unless otherwise noted below. Documentation of the HPI, Physical Exam and Medical Decision Making performed by mid level providers is based on my personal performance of the HPI, PE and MDM. For Physician Assistant/ Nurse Practitioner cases/documentation I have personally evaluated this patient and have completed at least one if not all key elements of the E/M (history, physical exam, and MDM). Additional findings are as noted.      CHIEF COMPLAINT       Chief Complaint   Patient presents with    Abdominal Pain     Patient c/o increased abd pain that started this morning. Patient states \"she has cancer in her abd, pelvis and liver that she takes pain meds for\". Patient states \" a little while ago she started having some chest pain and shortness of breath\".     Chest Pain    Shortness of Breath         HISTORY OF PRESENT ILLNESS    Angelica Guzman is a 74 y.o. female who presents with abd pain and nausea.       PAST MEDICAL HISTORY    has a past medical history of Breast cancer (HCC), Depression, HTN (hypertension), Hypercholesterolemia, Malignant GIST (gastrointestinal stromal tumor) (HCC), and Type II or unspecified type diabetes mellitus  1.030    Urine Hgb TRACE (A) NEGATIVE    pH, Urine 6.0 5.0 - 8.0    Protein, UA NEGATIVE NEGATIVE mg/dL    Urobilinogen, Urine Normal 0.0 - 1.0 EU/dL    Nitrite, Urine NEGATIVE NEGATIVE    Leukocyte Esterase, Urine NEGATIVE NEGATIVE    WBC, UA 5 TO 10 0 - 5 /HPF    RBC, UA 2 TO 5 0 - 2 /HPF    Epithelial Cells, UA 5 TO 10 0 - 5 /HPF    Bacteria, UA FEW (A) None   Troponin   Result Value Ref Range    Troponin, High Sensitivity 18 (H) 0 - 14 ng/L   Comprehensive Metabolic Panel w/ Reflex to MG   Result Value Ref Range    Sodium 143 135 - 144 mmol/L    Potassium 3.8 3.7 - 5.3 mmol/L    Chloride 108 (H) 98 - 107 mmol/L    CO2 25 20 - 31 mmol/L    Anion Gap 10 9 - 17 mmol/L    Glucose 108 (H) 70 - 99 mg/dL    BUN 12 8 - 23 mg/dL    Creatinine 0.5 0.5 - 0.9 mg/dL    Est, Glom Filt Rate >90 >60 mL/min/1.73m2    Calcium 7.8 (L) 8.6 - 10.4 mg/dL    Total Protein 5.5 (L) 6.4 - 8.3 g/dL    Albumin 3.1 (L) 3.5 - 5.2 g/dL    Albumin/Globulin Ratio 1.3 1.0 - 2.5    Total Bilirubin 0.5 0.3 - 1.2 mg/dL    Alkaline Phosphatase 66 35 - 104 U/L    ALT <5 (L) 5 - 33 U/L    AST 12 <32 U/L   Magnesium   Result Value Ref Range    Magnesium 1.8 1.6 - 2.6 mg/dL   CBC auto differential   Result Value Ref Range    WBC 6.4 3.5 - 11.0 k/uL    RBC 3.48 (L) 4.0 - 5.2 m/uL    Hemoglobin 10.2 (L) 12.0 - 16.0 g/dL    Hematocrit 30.8 (L) 36 - 46 %    MCV 88.5 80 - 100 fL    MCH 29.2 26 - 34 pg    MCHC 33.0 31 - 37 g/dL    RDW 15.9 (H) 12.5 - 15.4 %    Platelets 280 140 - 450 k/uL    MPV 6.3 6.0 - 12.0 fL    Neutrophils % 75 (H) 36 - 66 %    Lymphocytes % 10 (L) 24 - 44 %    Monocytes % 10 2 - 11 %    Eosinophils % 4 1 - 4 %    Basophils % 1 0 - 2 %    Neutrophils Absolute 4.90 1.8 - 7.7 k/uL    Lymphocytes Absolute 0.60 (L) 1.0 - 4.8 k/uL    Monocytes Absolute 0.60 0.1 - 1.2 k/uL    Eosinophils Absolute 0.20 0.0 - 0.4 k/uL    Basophils Absolute 0.00 0.0 - 0.2 k/uL   POC Glucose Fingerstick   Result Value Ref Range    POC Glucose 145 (H) 65 - 105

## 2024-12-22 LAB
ALBUMIN SERPL-MCNC: 3.1 G/DL (ref 3.5–5.2)
ALBUMIN/GLOB SERPL: 1.3 {RATIO} (ref 1–2.5)
ALP SERPL-CCNC: 66 U/L (ref 35–104)
ALT SERPL-CCNC: <5 U/L (ref 5–33)
ANION GAP SERPL CALCULATED.3IONS-SCNC: 10 MMOL/L (ref 9–17)
AST SERPL-CCNC: 12 U/L
BASOPHILS # BLD: 0 K/UL (ref 0–0.2)
BASOPHILS NFR BLD: 1 % (ref 0–2)
BILIRUB SERPL-MCNC: 0.5 MG/DL (ref 0.3–1.2)
BUN SERPL-MCNC: 12 MG/DL (ref 8–23)
CALCIUM SERPL-MCNC: 7.8 MG/DL (ref 8.6–10.4)
CHLORIDE SERPL-SCNC: 108 MMOL/L (ref 98–107)
CO2 SERPL-SCNC: 25 MMOL/L (ref 20–31)
CREAT SERPL-MCNC: 0.5 MG/DL (ref 0.5–0.9)
EOSINOPHIL # BLD: 0.2 K/UL (ref 0–0.4)
EOSINOPHILS RELATIVE PERCENT: 4 % (ref 1–4)
ERYTHROCYTE [DISTWIDTH] IN BLOOD BY AUTOMATED COUNT: 15.9 % (ref 12.5–15.4)
GFR, ESTIMATED: >90 ML/MIN/1.73M2
GLUCOSE BLD-MCNC: 80 MG/DL (ref 65–105)
GLUCOSE BLD-MCNC: 95 MG/DL (ref 65–105)
GLUCOSE SERPL-MCNC: 108 MG/DL (ref 70–99)
HCT VFR BLD AUTO: 30.8 % (ref 36–46)
HGB BLD-MCNC: 10.2 G/DL (ref 12–16)
LYMPHOCYTES NFR BLD: 0.6 K/UL (ref 1–4.8)
LYMPHOCYTES RELATIVE PERCENT: 10 % (ref 24–44)
MAGNESIUM SERPL-MCNC: 1.8 MG/DL (ref 1.6–2.6)
MCH RBC QN AUTO: 29.2 PG (ref 26–34)
MCHC RBC AUTO-ENTMCNC: 33 G/DL (ref 31–37)
MCV RBC AUTO: 88.5 FL (ref 80–100)
MONOCYTES NFR BLD: 0.6 K/UL (ref 0.1–1.2)
MONOCYTES NFR BLD: 10 % (ref 2–11)
NEUTROPHILS NFR BLD: 75 % (ref 36–66)
NEUTS SEG NFR BLD: 4.9 K/UL (ref 1.8–7.7)
PLATELET # BLD AUTO: 280 K/UL (ref 140–450)
PMV BLD AUTO: 6.3 FL (ref 6–12)
POTASSIUM SERPL-SCNC: 3.8 MMOL/L (ref 3.7–5.3)
PROT SERPL-MCNC: 5.5 G/DL (ref 6.4–8.3)
RBC # BLD AUTO: 3.48 M/UL (ref 4–5.2)
SODIUM SERPL-SCNC: 143 MMOL/L (ref 135–144)
WBC OTHER # BLD: 6.4 K/UL (ref 3.5–11)

## 2024-12-22 PROCEDURE — 36415 COLL VENOUS BLD VENIPUNCTURE: CPT

## 2024-12-22 PROCEDURE — 99232 SBSQ HOSP IP/OBS MODERATE 35: CPT | Performed by: STUDENT IN AN ORGANIZED HEALTH CARE EDUCATION/TRAINING PROGRAM

## 2024-12-22 PROCEDURE — 82947 ASSAY GLUCOSE BLOOD QUANT: CPT

## 2024-12-22 PROCEDURE — 80053 COMPREHEN METABOLIC PANEL: CPT

## 2024-12-22 PROCEDURE — 85025 COMPLETE CBC W/AUTO DIFF WBC: CPT

## 2024-12-22 PROCEDURE — 1210000000 HC MED SURG R&B

## 2024-12-22 PROCEDURE — 2500000003 HC RX 250 WO HCPCS: Performed by: NURSE PRACTITIONER

## 2024-12-22 PROCEDURE — 83735 ASSAY OF MAGNESIUM: CPT

## 2024-12-22 PROCEDURE — 6360000002 HC RX W HCPCS: Performed by: NURSE PRACTITIONER

## 2024-12-22 RX ADMIN — ONDANSETRON 4 MG: 2 INJECTION INTRAMUSCULAR; INTRAVENOUS at 08:05

## 2024-12-22 RX ADMIN — SODIUM CHLORIDE, PRESERVATIVE FREE 10 ML: 5 INJECTION INTRAVENOUS at 08:06

## 2024-12-22 RX ADMIN — FENTANYL CITRATE 50 MCG: 50 INJECTION, SOLUTION INTRAMUSCULAR; INTRAVENOUS at 13:36

## 2024-12-22 RX ADMIN — ONDANSETRON 4 MG: 2 INJECTION INTRAMUSCULAR; INTRAVENOUS at 14:52

## 2024-12-22 RX ADMIN — FENTANYL CITRATE 50 MCG: 50 INJECTION, SOLUTION INTRAMUSCULAR; INTRAVENOUS at 21:44

## 2024-12-22 RX ADMIN — FENTANYL CITRATE 50 MCG: 50 INJECTION, SOLUTION INTRAMUSCULAR; INTRAVENOUS at 17:45

## 2024-12-22 RX ADMIN — FENTANYL CITRATE 50 MCG: 50 INJECTION, SOLUTION INTRAMUSCULAR; INTRAVENOUS at 09:10

## 2024-12-22 RX ADMIN — FENTANYL CITRATE 50 MCG: 50 INJECTION, SOLUTION INTRAMUSCULAR; INTRAVENOUS at 02:34

## 2024-12-22 RX ADMIN — ONDANSETRON 4 MG: 2 INJECTION INTRAMUSCULAR; INTRAVENOUS at 23:55

## 2024-12-22 ASSESSMENT — PAIN SCALES - GENERAL
PAINLEVEL_OUTOF10: 5
PAINLEVEL_OUTOF10: 4

## 2024-12-22 ASSESSMENT — PAIN DESCRIPTION - LOCATION
LOCATION: ABDOMEN
LOCATION: ABDOMEN;HEAD

## 2024-12-22 NOTE — CONSULTS
General Surgery   Consultation        PATIENT NAME: Angelica Guzman   YOB: 1950  MRN: [id number]    ADMISSION DATE: 12/21/2024  5:00 PM     Consulting Physician: Nikkie BOSS-CNP    Consulted Physician: Dr. Baker      TODAY'S DATE: 12/21/2024    Consult regarding: SBO       Cc: Abdominal pain     HPI:  The patient is a 74 y.o. female  who presents with abdominal pain.  She has a history significant for a malignant metastatic GIST tumor, on oral chemotherapy, DM type II, breast cancer, HTN, and HLD.  She presents with nausea and vomiting that began yesterday.  She has been able to tolerate oral intake today.  She reports having diffuse mid abdominal pain.  She is unsure when she last passed flatus, but did have a normal bowel movement this morning.  Laboratory workup upon admission was unremarkable.  She underwent CT imaging that demonstrated a small bowel obstruction with a transition point in the right lower quadrant, with suggestion of superimposed enteritis.  Additionally her CT noted multiple hepatic masses consistent with metastatic disease, and a stable large mass in the left hemiabdomen measuring 18 x 13 x 12 cm, as well as smaller peritoneal nodules scattered in the abdomen and pelvis.  General surgery was consulted for these findings.    Past Medical History:        Diagnosis Date    Breast cancer (HCC)     Depression 05/01/2015    HTN (hypertension) 05/01/2015    Hypercholesterolemia 05/01/2015    Malignant GIST (gastrointestinal stromal tumor) (HCC)     Type II or unspecified type diabetes mellitus without mention of complication, not stated as uncontrolled        Past Surgical History:        Procedure Laterality Date    HYSTERECTOMY (CERVIX STATUS UNKNOWN)  2000    MASTECTOMY Left 2019       Medications Prior to Admission:   Medications Prior to Admission: buPROPion (WELLBUTRIN XL) 150 MG extended release tablet, Take 1 tablet by mouth every morning  sennosides-docusate sodium  point is identified in the right lower quadrant. The colon is normal in course and caliber without evidence of wall thickening or obstruction.  Normal appendix. Pelvis: Normal bladder.  Status post hysterectomy.  No adnexal masses. Peritoneum/Retroperitoneum: Abdominal and pelvic ascites is present.  No free air. Stable large mass is seen in the left hemiabdomen measuring 18 x 13 x 12 cm. Other smaller peritoneal nodules are also identified scattered in the abdomen and pelvis. No retroperitoneal or iliac chain lymph nodes. Aorta and its branches are patent.  Severe atherosclerosis is present. Bones/Soft Tissues: No acute or aggressive osseous lesion.     1. Resolved patchy bibasal infiltrates. 2. Resolved right pleural effusion. 3. Mild dilatation of the ascending thoracic aorta measuring 4 cm in diameter. 4. Multiple hepatic masses, largest measuring 6.9 cm, consistent with metastatic disease. 5. Stable large mass in the left hemiabdomen measuring 18 x 13 x 12 cm. Other smaller peritoneal nodules are also identified scattered in the abdomen and pelvis. 6. Mild ascites. 7. Small bowel obstruction with transition point in the right lower quadrant. Suggestion of superimposed enteritis 8. Status post hysterectomy. 9. Severe atherosclerosis.          ASSESSMENT   Principal Problem:    SBO (small bowel obstruction) (HCC)  Active Problems:    Malignant gastrointestinal stromal tumor (HCC)    Hypercholesterolemia    Essential hypertension    Malignant neoplasm of right female breast (HCC)    Type 2 diabetes mellitus with hypoglycemia without coma, without long-term current use of insulin (HCC)  Resolved Problems:    * No resolved hospital problems. *    74-year-old female with metastatic GIST tumor, who presents with nausea and vomiting.  CT concerning for SBO.    PLAN    Patient seen and examined at bedside with attending.  Labs, imaging, history and physical exam findings reviewed.  Patient is nontoxic-appearing.

## 2024-12-22 NOTE — PROGRESS NOTES
[facility]  Office: [phone]  Mo Scales DO, Pedro Pablo Cadet DO, Doc Garrett DO, Noel Lyle DO, Livan Beltran MD, Mayra Haider MD, Beverly Payton MD, Devorah Robert MD,  Perico Hirsch MD, Emmy Garcia MD, Reza Vora MD,  Geovany Martell DO, Liana Grissom MD, Jacob Rivera MD, Alvaro Scales DO, Chey Rapp MD,  Yan Morales DO, Margot Morales MD, Nichole Nova MD, Jessica Bergman MD, Cheyenne Quiros MD,  Laurent Houser MD, Rosibel Floyd MD, Randal Shea MD, Matilde Champagne MD, Bryce Carpenter MD, Sophia Haro MD, Adriano Ruiz DO, Chaitanya Ortega MD, Shirley Waterhouse, CNP,  Gregoria Milner, CNP, Adriano Servin, CNP,  Ching Ott, ZOIE, Cathy Hickman, CNP, Alicia Hernandez, CNP, Mirna Ceja, CNP, Katie Koenig, CNP, JOSH FieldsC, JOSH LewisC, Chelsea Au, CNP, Dashawn Ireland, CNP,  Bailey Stephen, CNP, Mariam Tomlinson, CNP,  Yaz Das, CNP, Livier Guzman, CNP         [facility]   IN-PATIENT SERVICE   [facility]    Progress Note    12/22/2024    9:14 AM    Name:   Angelica Guzman  MRN:     [id number]     Acct:      [id number]   Room:   [address] [place] [address] Day:  1  Admit Date:  12/21/2024  5:00 PM    PCP:   Catarino Luciano MD  Code Status:  Full Code    Subjective:     C/C:   Chief Complaint   Patient presents with    Abdominal Pain     Patient c/o increased abd pain that started this morning. Patient states \"she has cancer in her abd, pelvis and liver that she takes pain meds for\". Patient states \" a little while ago she started having some chest pain and shortness of breath\".     Chest Pain    Shortness of Breath     Interval History Status: improved.     Patient seen and examined at bedside this morning. No acute events overnight. Patient resting in bed. Pain is improved from admission. Patient denies passing any flatus or having a BM. Has some nausea this am but no vomiting.     Brief History:     Angelica Guzman  of right female breast (HCC) 12/21/2024 Yes    Type 2 diabetes mellitus with hypoglycemia without coma, without long-term current use of insulin (HCC) 12/21/2024 Yes       Plan:     SBO  General surgery following  No sugical intervention planned at this time  If symptoms worsen, consider NGT  NPO  Continue IVF    Sophia Haro MD  12/22/2024  9:14 AM

## 2024-12-22 NOTE — CONSULTS
[facility] Hematology and Oncology - Consult Note  12/22/2024, 12:55 PM    Admit Date: 12/21/2024  Referring Physician: Sophia Haro MD   PCP: Catarino Luciano MD    Reason for Consult: Abdominal Pain     History of Present Illness:   Ms. Guzman is a pleasant 74 year old female with past medical history of triple negative breast cancer, HTN, HLD, Type II DM and recently GIST tumor, patient of Dr. Rosario. 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   She was admitted to the hospital for increase abdominal pain and nausea and vomiting x 24 hours, unable to keep anything down. She came to ER and CT scan showed small bowel obstruction with transition point in the right lower quadrant, suggestion of superimposed enteritis.She was admitted for small bowel obstruction, surgery has been consulted no surgical intervention planned at this time.   She is passing gas and abdomen is soft. Last BM was yesterday.   Her daughter and son are at bedside.     Impression:   74 year old female with metastatic GIST tumor admitted for small bowel obstruction     Plan:   1. Small Bowel Obstruction  -appears to be resolving  -surgery not planning any surgical interventions  -on bowel rest    2. Metastatic GIST tumor  -on imatinib   -she had a MRI of the abdomen 10/2024 and massses are similar in size, will see if tomorrow can push images to compare CT Scan that was completed in house to outpatient MRI of the abdomen  -multiple questions from patient, daughter and son were answered to their satisfaction  -primary Dr. Haro was at bedside and discussed with him as well.     PMH:   Past Medical History:   Diagnosis Date    Breast cancer (HCC)     Depression 05/01/2015    HTN (hypertension) 05/01/2015    Hypercholesterolemia 05/01/2015    Malignant GIST (gastrointestinal stromal tumor) (HCC)     Type II or unspecified

## 2024-12-22 NOTE — CARE COORDINATION
Case Management Assessment  Initial Evaluation    Date/Time of Evaluation: 12/22/2024 10:12 AM  Assessment Completed by: Brenda Marley RN    If patient is discharged prior to next notation, then this note serves as note for discharge by case management.    Patient Name: Angelica Guzman                   YOB: 1950  Diagnosis: SBO (small bowel obstruction) ([facility]) [K56.609]                   Date / Time: 12/21/2024  5:00 PM    Patient Admission Status: Inpatient   Readmission Risk (Low < 19, Mod (19-27), High > 27): Readmission Risk Score: 12.5    Current PCP: Catarino Luciano MD  PCP verified by CM? (P) Yes    Chart Reviewed: Yes      History Provided by: (P) Patient  Patient Orientation: (P) Alert and Oriented    Patient Cognition: (P) Alert    Hospitalization in the last 30 days (Readmission):  No    If yes, Readmission Assessment in  Navigator will be completed.    Advance Directives:      Code Status: Full Code   Patient's Primary Decision Maker is: (P) Legal Next of Kin      Discharge Planning:    Patient lives with: (P) Children, Family Members Type of Home: (P) House  Primary Care Giver: (P) Self  Patient Support Systems include: (P) Children, Family Members   Current Financial resources: (P) Medicare  Current community resources:    Current services prior to admission: (P) None            Current DME:              Type of Home Care services:  (P) None    ADLS  Prior functional level: (P) Independent in ADLs/IADLs  Current functional level: (P) Independent in ADLs/IADLs    PT AM-PAC:   /24  OT AM-PAC:   /24    Family can provide assistance at DC: (P) No  Would you like Case Management to discuss the discharge plan with any other family members/significant others, and if so, who? (P) No  Plans to Return to Present Housing: (P) Yes  Other Identified Issues/Barriers to RETURNING to current housing: None noted.   Potential Assistance needed at discharge: (P) N/A            Potential DME:    Patient

## 2024-12-22 NOTE — ED NOTES
ED to inpatient nurses report      Chief Complaint:  Chief Complaint   Patient presents with    Abdominal Pain     Patient c/o increased abd pain that started this morning. Patient states \"she has cancer in her abd, pelvis and liver that she takes pain meds for\". Patient states \" a little while ago she started having some chest pain and shortness of breath\".     Chest Pain    Shortness of Breath     Present to ED from: home    MOA:     LOC: alert and orientated to name, place, date  Mobility: Independent  Oxygen Baseline: room air     Current needs required: none   Pending ED orders: none   Present condition: stable     Why did the patient come to the ED? Abdominal pain, nausea since this morning. History of abdominal cancer, unable to keep pain meds down this morning. Feeling better at this time  What is the plan? Pain/nausea control, surgery consult   Any procedures or intervention occur? none  Any safety concerns??none  CODE STATUS Prior  Diet Diet NPO    Mental Status:       Psych Assessment:   Psychosocial  Psychosocial (WDL): Within Defined Limits  Vital signs   Vitals:    12/21/24 1813 12/21/24 1846 12/21/24 1901 12/21/24 2056   BP: (!) 148/72 139/67 139/60    Pulse: 70 75 79 74   Resp: 15 11 19 20   Temp:       TempSrc:       SpO2: 98% 95% 96%    Weight:       Height:            Vitals:  Patient Vitals for the past 24 hrs:   BP Temp Temp src Pulse Resp SpO2 Height Weight   12/21/24 2056 -- -- -- 74 20 -- -- --   12/21/24 1901 139/60 -- -- 79 19 96 % -- --   12/21/24 1846 139/67 -- -- 75 11 95 % -- --   12/21/24 1813 (!) 148/72 -- -- 70 15 98 % -- --   12/21/24 1746 (!) 149/62 -- -- -- -- 100 % -- --   12/21/24 1709 138/63 98.1 °F (36.7 °C) Oral 75 18 100 % 1.651 m (5' 5\") 81.6 kg (180 lb)      Visit Vitals  /60   Pulse 74   Temp 98.1 °F (36.7 °C) (Oral)   Resp 20   Ht 1.651 m (5' 5\")   Wt 81.6 kg (180 lb)   SpO2 96%   BMI 29.95 kg/m²        LDAs:   Peripheral IV 12/21/24 Left Antecubital (Active)

## 2024-12-22 NOTE — H&P
[facility]  Office: [phone]  Mo Scales DO, Pedro Pablo Cadet DO, Doc Garrett DO, Noel Lyle DO, Livan Beltran MD, Mayra Haider MD, Beverly Payton MD, Devorah Robert MD,  Perico Hirsch MD, Emmy Garcia MD, Reza Vora MD,  Geovany Martell DO, Liana Grissom MD, Jacob Rivera MD, Alvaro Scales DO, Chey Rapp MD,  Yan Morales DO, Margot Morales MD, Nichole Nova MD, Jessica Bergman MD, Cheyenne Quiros MD,  Laurent Houser MD, Rosibel Floyd MD, Randal Shea MD, Matilde Champagne MD, Bryce Carpenter MD, Sophia Haro MD, Adriano Ruiz DO, Chaitanya Ortega MD, Shirley Waterhouse, CNP,  Gregoria Milner CNP, Adriano Servin, IRVIN,  Ching Ott, ZOIE, Cathy Hickman, CNP, Alicia Hernandez, CNP, Mirna Ceja, CNP, Katie Koenig, CNP, Mavis Carney PA-C, JOSH LewisC, Chelsea Au, IRVIN, Dashawn Ireland, CNP,  Bailey Stephen, CNP, Mariam Tomlinson, CNP,  Yaz Das, CNP, Livier Guzman, CNP         [facility]   IN-PATIENT SERVICE   [facility]    HISTORY AND PHYSICAL EXAMINATION            Date:   12/21/2024  Patient name:  Angelica Guzman  Date of admission:  12/21/2024  5:00 PM  MRN:   [id number]  Account:  [id number]  YOB: 1950  PCP:    No primary care provider on file.  Room:   2TRAUMA/2TRAUMA  Code Status:    Prior    Chief Complaint:     Chief Complaint   Patient presents with    Abdominal Pain     Patient c/o increased abd pain that started this morning. Patient states \"she has cancer in her abd, pelvis and liver that she takes pain meds for\". Patient states \" a little while ago she started having some chest pain and shortness of breath\".     Chest Pain    Shortness of Breath       History Obtained From:     patient, electronic medical record    History of Present Illness:     Angelica Guzman is a 74 y.o.  female with a history of metastatic cancer, malignant gastrointestinal stromal tumor on oral chemotherapy, type 2

## 2024-12-22 NOTE — PLAN OF CARE
Problem: Chronic Conditions and Co-morbidities  Goal: Patient's chronic conditions and co-morbidity symptoms are monitored and maintained or improved  Outcome: Progressing  Flowsheets (Taken 12/21/2024 2218)  Care Plan - Patient's Chronic Conditions and Co-Morbidity Symptoms are Monitored and Maintained or Improved: Monitor and assess patient's chronic conditions and comorbid symptoms for stability, deterioration, or improvement     Problem: Discharge Planning  Goal: Discharge to home or other facility with appropriate resources  Outcome: Progressing  Flowsheets (Taken 12/21/2024 2218)  Discharge to home or other facility with appropriate resources:   Identify barriers to discharge with patient and caregiver   Arrange for needed discharge resources and transportation as appropriate     Problem: Safety - Adult  Goal: Free from fall injury  Outcome: Progressing  Flowsheets (Taken 12/21/2024 2218)  Free From Fall Injury: Instruct family/caregiver on patient safety     Problem: Gastrointestinal - Adult  Goal: Minimal or absence of nausea and vomiting  Outcome: Progressing  Flowsheets (Taken 12/21/2024 2218)  Minimal or absence of nausea and vomiting:   Administer IV fluids as ordered to ensure adequate hydration   Maintain NPO status until nausea and vomiting are resolved   Administer ordered antiemetic medications as needed   Provide nonpharmacologic comfort measures as appropriate     Problem: Gastrointestinal - Adult  Goal: Maintains or returns to baseline bowel function  Outcome: Progressing  Flowsheets (Taken 12/21/2024 2218)  Maintains or returns to baseline bowel function:   Assess bowel function   Encourage oral fluids to ensure adequate hydration   Administer IV fluids as ordered to ensure adequate hydration   Administer ordered medications as needed   Encourage mobilization and activity

## 2024-12-22 NOTE — PROGRESS NOTES
General Surgery Progress Note            PATIENT NAME: Angelica Guzman     TODAY'S DATE: 12/22/2024, 7:47 AM    SUBJECTIVE:    Patient seen and examined at bedside.  No acute events overnight.  VSS, afebrile.  Patient reports some continued abdominal discomfort with nausea.  No vomiting.  Pain is controlled.  Patient does report that she is passing a little bit of flatus.  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  Discussed with patient no plans for surgical intervention, and that any type of surgery offered would likely be more palliative/comfort in nature such as a decompressive G-tube.        OBJECTIVE:   VITALS:  /63   Pulse 72   Temp 99 °F (37.2 °C) (Oral)   Resp 16   Ht 1.651 m (5' 5\")   Wt 86.8 kg (191 lb 5.8 oz)   SpO2 98%   BMI 31.84 kg/m²      INTAKE/OUTPUT:    No intake or output data in the 24 hours ending 12/22/24 0747    PHYSICAL EXAM  GEN: Alert, awake, oriented, NAD  HEENT: normocephalic, no scleral icterus, moist mucous membranes  CV: Regular rate  LUNG: no respiratory distress, no audible wheezing  ABDOMEN: Soft, nondistended, palpable mass in the left mid to lower abdomen.  Minimal right mid, right lower quadrant, and suprapubic tenderness.  No rebound, guarding, or rigidity.  EXTREMITIES: No edema, cyanosis or clubbing    Data:  CBC with Differential:    Lab Results   Component Value Date/Time    WBC 6.4 12/22/2024 04:45 AM    RBC 3.48 12/22/2024 04:45 AM    RBC 3.86 04/29/2024 08:46 AM    HGB 10.2 12/22/2024 04:45 AM    HCT 30.8 12/22/2024 04:45 AM     12/22/2024 04:45 AM    MCV 88.5 12/22/2024 04:45 AM    MCH 29.2 12/22/2024 04:45 AM    MCHC 33.0 12/22/2024 04:45 AM    RDW 15.9 12/22/2024 04:45 AM    NRBC 0.00 11/19/2024 10:26 AM    LYMPHOPCT 10 12/22/2024 04:45 AM    LYMPHOPCT 13.5 02/08/2022 01:58 PM    MONOPCT 10 12/22/2024 04:45 AM    EOSPCT 4 12/22/2024 04:45 AM    BASOPCT 1 12/22/2024 04:45 AM    MONOSABS 0.60 12/22/2024  Multiple subcentimeter mediastinal and hilar lymph nodes are present.  Calcified subcarinal and right hilar lymph nodes also noted. Lungs/pleura: Patent central airways.  There are resolved patchy bibasal infiltrates.  Mild bibasilar atelectasis.  Previously noted right pleural effusion has resolved.  Stable calcified nodule in the posterior right base. No evidence of pneumothorax. Organs: Liver demonstrates dominant heterogeneous mass centrally measuring approximately 5.2 x 6.9 cm as a conglomerate.  Other smaller hypodense lesions are nonspecific in segment 7 measuring 6 mm.  Left hepatic lobe masses measure up to 5 mm and 10 mm. The gallbladder, spleen, pancreas, adrenal glands, and kidneys demonstrate no acute abnormality.  The collecting systems are unremarkable. GI/Bowel: The stomach is distended with fluid but otherwise unremarkable. The small bowel is fluid-filled with mild distention up to 3.5 cm.  A transition point is identified in the right lower quadrant. The colon is normal in course and caliber without evidence of wall thickening or obstruction.  Normal appendix. Pelvis: Normal bladder.  Status post hysterectomy.  No adnexal masses. Peritoneum/Retroperitoneum: Abdominal and pelvic ascites is present.  No free air. Stable large mass is seen in the left hemiabdomen measuring 18 x 13 x 12 cm. Other smaller peritoneal nodules are also identified scattered in the abdomen and pelvis. No retroperitoneal or iliac chain lymph nodes. Aorta and its branches are patent.  Severe atherosclerosis is present. Bones/Soft Tissues: No acute or aggressive osseous lesion.     1. Resolved patchy bibasal infiltrates. 2. Resolved right pleural effusion. 3. Mild dilatation of the ascending thoracic aorta measuring 4 cm in diameter. 4. Multiple hepatic masses, largest measuring 6.9 cm, consistent with metastatic disease. 5. Stable large mass in the left hemiabdomen measuring 18 x 13 x 12 cm. Other smaller peritoneal nodules

## 2024-12-23 LAB
EKG ATRIAL RATE: 80 BPM
EKG P AXIS: 21 DEGREES
EKG P-R INTERVAL: 146 MS
EKG Q-T INTERVAL: 422 MS
EKG QRS DURATION: 80 MS
EKG QTC CALCULATION (BAZETT): 486 MS
EKG R AXIS: 4 DEGREES
EKG T AXIS: 28 DEGREES
EKG VENTRICULAR RATE: 80 BPM
GLUCOSE BLD-MCNC: 108 MG/DL (ref 65–105)
GLUCOSE BLD-MCNC: 158 MG/DL (ref 65–105)
GLUCOSE BLD-MCNC: 176 MG/DL (ref 65–105)
GLUCOSE BLD-MCNC: 182 MG/DL (ref 65–105)
GLUCOSE BLD-MCNC: 67 MG/DL (ref 65–105)
GLUCOSE BLD-MCNC: 70 MG/DL (ref 65–105)
GLUCOSE BLD-MCNC: 79 MG/DL (ref 65–105)
GLUCOSE BLD-MCNC: 93 MG/DL (ref 65–105)

## 2024-12-23 PROCEDURE — 6360000002 HC RX W HCPCS: Performed by: STUDENT IN AN ORGANIZED HEALTH CARE EDUCATION/TRAINING PROGRAM

## 2024-12-23 PROCEDURE — 2580000003 HC RX 258: Performed by: NURSE PRACTITIONER

## 2024-12-23 PROCEDURE — 99232 SBSQ HOSP IP/OBS MODERATE 35: CPT | Performed by: STUDENT IN AN ORGANIZED HEALTH CARE EDUCATION/TRAINING PROGRAM

## 2024-12-23 PROCEDURE — 6360000002 HC RX W HCPCS: Performed by: NURSE PRACTITIONER

## 2024-12-23 PROCEDURE — 1210000000 HC MED SURG R&B

## 2024-12-23 PROCEDURE — 82947 ASSAY GLUCOSE BLOOD QUANT: CPT

## 2024-12-23 PROCEDURE — 2500000003 HC RX 250 WO HCPCS: Performed by: NURSE PRACTITIONER

## 2024-12-23 PROCEDURE — 93010 ELECTROCARDIOGRAM REPORT: CPT | Performed by: INTERNAL MEDICINE

## 2024-12-23 RX ORDER — KETOROLAC TROMETHAMINE 30 MG/ML
30 INJECTION, SOLUTION INTRAMUSCULAR; INTRAVENOUS ONCE
Status: COMPLETED | OUTPATIENT
Start: 2024-12-23 | End: 2024-12-23

## 2024-12-23 RX ORDER — DIPHENHYDRAMINE HYDROCHLORIDE 50 MG/ML
25 INJECTION INTRAMUSCULAR; INTRAVENOUS ONCE
Status: COMPLETED | OUTPATIENT
Start: 2024-12-23 | End: 2024-12-23

## 2024-12-23 RX ORDER — PROCHLORPERAZINE EDISYLATE 5 MG/ML
10 INJECTION INTRAMUSCULAR; INTRAVENOUS ONCE
Status: COMPLETED | OUTPATIENT
Start: 2024-12-23 | End: 2024-12-23

## 2024-12-23 RX ORDER — DEXAMETHASONE SODIUM PHOSPHATE 4 MG/ML
10 INJECTION, SOLUTION INTRA-ARTICULAR; INTRALESIONAL; INTRAMUSCULAR; INTRAVENOUS; SOFT TISSUE ONCE
Status: COMPLETED | OUTPATIENT
Start: 2024-12-23 | End: 2024-12-23

## 2024-12-23 RX ADMIN — PROCHLORPERAZINE EDISYLATE 10 MG: 5 INJECTION INTRAMUSCULAR; INTRAVENOUS at 10:17

## 2024-12-23 RX ADMIN — SODIUM CHLORIDE, PRESERVATIVE FREE 10 ML: 5 INJECTION INTRAVENOUS at 20:40

## 2024-12-23 RX ADMIN — FENTANYL CITRATE 50 MCG: 50 INJECTION, SOLUTION INTRAMUSCULAR; INTRAVENOUS at 01:44

## 2024-12-23 RX ADMIN — SODIUM CHLORIDE: 9 INJECTION, SOLUTION INTRAVENOUS at 01:46

## 2024-12-23 RX ADMIN — DEXTROSE MONOHYDRATE 125 ML: 100 INJECTION, SOLUTION INTRAVENOUS at 09:08

## 2024-12-23 RX ADMIN — FENTANYL CITRATE 50 MCG: 50 INJECTION, SOLUTION INTRAMUSCULAR; INTRAVENOUS at 05:45

## 2024-12-23 RX ADMIN — FENTANYL CITRATE 50 MCG: 50 INJECTION, SOLUTION INTRAMUSCULAR; INTRAVENOUS at 12:16

## 2024-12-23 RX ADMIN — DEXAMETHASONE SODIUM PHOSPHATE 10 MG: 4 INJECTION INTRA-ARTICULAR; INTRALESIONAL; INTRAMUSCULAR; INTRAVENOUS; SOFT TISSUE at 10:16

## 2024-12-23 RX ADMIN — KETOROLAC TROMETHAMINE 30 MG: 30 INJECTION, SOLUTION INTRAMUSCULAR at 10:17

## 2024-12-23 RX ADMIN — FENTANYL CITRATE 50 MCG: 50 INJECTION, SOLUTION INTRAMUSCULAR; INTRAVENOUS at 20:40

## 2024-12-23 RX ADMIN — DIPHENHYDRAMINE HYDROCHLORIDE 25 MG: 50 INJECTION INTRAMUSCULAR; INTRAVENOUS at 10:17

## 2024-12-23 ASSESSMENT — PAIN SCALES - GENERAL
PAINLEVEL_OUTOF10: 6
PAINLEVEL_OUTOF10: 4
PAINLEVEL_OUTOF10: 6

## 2024-12-23 ASSESSMENT — PAIN DESCRIPTION - LOCATION
LOCATION: ABDOMEN;HEAD
LOCATION: ABDOMEN
LOCATION: ABDOMEN

## 2024-12-23 NOTE — PROGRESS NOTES
General Surgery Progress Note            PATIENT NAME: Angelica Guzman     TODAY'S DATE: 12/23/2024, 12:16 PM    SUBJECTIVE:    Patient seen and examined at bedside.  No acute events overnight.  VSS, afebrile.  Patient states she feels okay this morning.  She had some slight nausea but that has resolved.  She is passing flatus and reports having had a bowel movement this morning.  She was initiated on clear liquids, and is tolerating this fine.         OBJECTIVE:   VITALS:  /75   Pulse 77   Temp 97.8 °F (36.6 °C) (Oral)   Resp 16   Ht 1.651 m (5' 5\")   Wt 87 kg (191 lb 12.8 oz)   SpO2 (!) 89%   BMI 31.92 kg/m²      INTAKE/OUTPUT:    No intake or output data in the 24 hours ending 12/23/24 1216    PHYSICAL EXAM  GEN: Alert, awake, oriented, NAD  HEENT: normocephalic, no scleral icterus, moist mucous membranes  CV: Regular rate  LUNG: no respiratory distress, no audible wheezing  ABDOMEN: Soft, nondistended, palpable mass in the left lower to mid abdomen.  Nontender.  No rebound, guarding, or rigidity.  EXTREMITIES: No edema, cyanosis or clubbing    Data:  CBC with Differential:    Lab Results   Component Value Date/Time    WBC 6.4 12/22/2024 04:45 AM    RBC 3.48 12/22/2024 04:45 AM    RBC 3.86 04/29/2024 08:46 AM    HGB 10.2 12/22/2024 04:45 AM    HCT 30.8 12/22/2024 04:45 AM     12/22/2024 04:45 AM    MCV 88.5 12/22/2024 04:45 AM    MCH 29.2 12/22/2024 04:45 AM    MCHC 33.0 12/22/2024 04:45 AM    RDW 15.9 12/22/2024 04:45 AM    NRBC 0.00 11/19/2024 10:26 AM    LYMPHOPCT 10 12/22/2024 04:45 AM    LYMPHOPCT 13.5 02/08/2022 01:58 PM    MONOPCT 10 12/22/2024 04:45 AM    EOSPCT 4 12/22/2024 04:45 AM    BASOPCT 1 12/22/2024 04:45 AM    MONOSABS 0.60 12/22/2024 04:45 AM    LYMPHSABS 0.60 12/22/2024 04:45 AM    EOSABS 0.20 12/22/2024 04:45 AM    BASOSABS 0.00 12/22/2024 04:45 AM    DIFFTYPE NOT REPORTED 01/17/2022 02:15 PM     BMP:    Lab Results   Component Value Date/Time     12/22/2024 04:45 AM        ASSESSMENT   74-year-old female with metastatic GIST tumor with partial small bowel obstruction, resolving    Plan  Continue fluid with diet, if continues to have bowel function and has symptomatically improved tomorrow, would be okay with advancing as tolerated  Monitor abdominal exam  Daily labs, replete electrolytes as needed  Encourage ambulation and incentive spirometer use  Medical management per primary      -----------------------------------------------  Brenda Vasquez DO  General Surgery Resident, PGY-5  12/23/2024 at 12:16 PM      Attending Physician Statement  I have discussed the case with Dr Vasquez, including pertinent history and exam findings with the resident. I have seen and examined the patient and the key elements of the encounter have been performed by me.  I agree with the assessment, plan and orders as documented by the resident.      Electronically signed by Oliver Baker IV, DO  on 12/24/2024 at 8:09 AM

## 2024-12-23 NOTE — CARE COORDINATION
Patient sitting up in bed, tolerating a liquid diet she says, continues with plan to return home on discharge, no needs identified.

## 2024-12-23 NOTE — PLAN OF CARE
Problem: Chronic Conditions and Co-morbidities  Goal: Patient's chronic conditions and co-morbidity symptoms are monitored and maintained or improved  Outcome: Progressing  Flowsheets (Taken 12/21/2024 2218)  Care Plan - Patient's Chronic Conditions and Co-Morbidity Symptoms are Monitored and Maintained or Improved: Monitor and assess patient's chronic conditions and comorbid symptoms for stability, deterioration, or improvement     Problem: Discharge Planning  Goal: Discharge to home or other facility with appropriate resources  Outcome: Progressing  Flowsheets (Taken 12/21/2024 2218)  Discharge to home or other facility with appropriate resources:   Identify barriers to discharge with patient and caregiver   Arrange for needed discharge resources and transportation as appropriate     Problem: Safety - Adult  Goal: Free from fall injury  Outcome: Progressing  Flowsheets (Taken 12/22/2024 2102)  Free From Fall Injury: Instruct family/caregiver on patient safety     Problem: Gastrointestinal - Adult  Goal: Minimal or absence of nausea and vomiting  Outcome: Progressing  Flowsheets (Taken 12/22/2024 2102)  Minimal or absence of nausea and vomiting:   Administer IV fluids as ordered to ensure adequate hydration   Maintain NPO status until nausea and vomiting are resolved   Nasogastric tube to low intermittent suction as ordered   Administer ordered antiemetic medications as needed   Provide nonpharmacologic comfort measures as appropriate     Problem: Gastrointestinal - Adult  Goal: Maintains or returns to baseline bowel function  Outcome: Progressing  Flowsheets (Taken 12/22/2024 2102)  Maintains or returns to baseline bowel function:   Assess bowel function   Encourage oral fluids to ensure adequate hydration   Administer IV fluids as ordered to ensure adequate hydration   Administer ordered medications as needed   Encourage mobilization and activity

## 2024-12-23 NOTE — PROGRESS NOTES
[facility] Hematology and Oncology - Daily Progress Note  2024, 8:28 AM    Admit Date: 2024  PCP: Catarino Luciano MD    Impression/Plan:   Plan:   1. Small Bowel Obstruction  -appears to be resolving, patient passing gas  -surgery not planning any surgical interventions  -on bowel rest     2. Metastatic GIST tumor  -on imatinib   -she had a MRI of the abdomen 10/2024 and massses are similar in size, will see if can push images to compare CT Scan that was completed in house to outpatient MRI of the abdomen      Patient discussed with Dr. Lopez. Please call with questions.     Subjective/Interval History:   Patient seen and examined at bedside, daughter present. Patient complains of headache, requesting tylenol, will place order. Patient is afebrile. VSS. Maintained on RA. No distress noted   Physical Examination :   Patient Vitals for the past 96 hrs (Last 3 readings):   Weight   24 0547 87 kg (191 lb 12.8 oz)   24 0551 86.8 kg (191 lb 5.8 oz)   24 1709 81.6 kg (180 lb)      Temperature Range: Temp: 97.8 °F (36.6 °C) Temp  Av.3 °F (36.8 °C)  Min: 97.8 °F (36.6 °C)  Max: 98.8 °F (37.1 °C)  Weight change: 5.352 kg (11 lb 12.8 oz)    Physical Exam  Vitals reviewed.   Constitutional:       General: She is not in acute distress.     Appearance: Normal appearance. She is well-developed. She is obese. She is not ill-appearing or toxic-appearing.   HENT:      Head: Normocephalic and atraumatic.      Mouth/Throat:      Mouth: Mucous membranes are dry.      Pharynx: Oropharynx is clear. No oropharyngeal exudate or posterior oropharyngeal erythema.   Eyes:      Extraocular Movements: Extraocular movements intact.      Conjunctiva/sclera: Conjunctivae normal.      Pupils: Pupils are equal, round, and reactive to light.   Cardiovascular:      Rate and Rhythm: Normal rate and regular rhythm.      Pulses: Normal pulses.      Heart sounds: Normal heart sounds. No murmur heard.     No  friction rub. No gallop.   Pulmonary:      Effort: Pulmonary effort is normal.      Breath sounds: Normal breath sounds. No wheezing.   Abdominal:      General: Bowel sounds are normal. There is distension.      Palpations: Abdomen is soft.      Tenderness: There is abdominal tenderness. There is no guarding or rebound.      Comments: Hypoactive bs   Musculoskeletal:         General: No swelling. Normal range of motion.      Cervical back: Normal range of motion and neck supple. No rigidity or tenderness.      Right lower leg: No edema.      Left lower leg: No edema.   Skin:     General: Skin is warm and dry.      Coloration: Skin is pale.      Findings: Bruising present. No erythema.   Neurological:      General: No focal deficit present.      Mental Status: She is alert and oriented to person, place, and time. Mental status is at baseline.      Sensory: No sensory deficit.   Psychiatric:         Mood and Affect: Mood normal.         Behavior: Behavior normal.         Thought Content: Thought content normal.         Judgment: Judgment normal.          Laboratory data:        Recent Labs     12/21/24 1815 12/22/24  0445   WBC 10.4 6.4   HGB 11.9* 10.2*   HCT 36.5 30.8*    280   MCV 88.7 88.5   NEUTROABS 9.99* 4.90       No results for input(s): \"PROTIME\", \"INR\", \"DDIMER\" in the last 72 hours.    Invalid input(s): \"PTT\"    Recent Labs     12/21/24 1815 12/22/24  0445   K 3.8 3.8    108*   CO2 23 25   BUN 14 12   CREATININE 0.6 0.5   CALCIUM 8.6 7.8*   BILITOT 0.6 0.5   ALKPHOS 89 66   ALT 6 <5*   AST 12 12   MG  --  1.8       TSH:   Lab Results   Component Value Date    TSH 3.13 01/17/2022     VITAMIN B12:   Lab Results   Component Value Date    ILFXJJAW28 >1000 (H) 10/29/2024     FOLATE:    Lab Results   Component Value Date    FOLATE 11.7 10/29/2024     IRON:   Lab Results   Component Value Date    IRON 24 (L) 10/29/2024    TIBC 224 (L) 10/29/2024    FERRITIN 357.0 10/29/2024       Medications:

## 2024-12-23 NOTE — PROGRESS NOTES
Spiritual Health History and Assessment/Progress Note  [facility]    (P) Spiritual/Emotional Needs, Initial Encounter,  ,  ,      Name: Angelica Guzman MRN: [id number]    Age: 74 y.o.     Sex: female   Language: English   Anglican: Other   SBO (small bowel obstruction) ([facility])     Date: 12/23/2024            Total Time Calculated: (P) 15 min              Spiritual Assessment began in [facility]        Referral/Consult From: (P) Rounding   Encounter Overview/Reason: (P) Spiritual/Emotional Needs, Initial Encounter  Service Provided For: (P) Patient    Pt. Has cancerous tumors intertwined with intestines, leading to bowel obstruction    Skye, Belief, Meaning:   Patient identifies as spiritual, is connected with a skye tradition or spiritual practice, and has beliefs or practices that help with coping during difficult times  Family/Friends No family/friends present      Importance and Influence:  Patient has spiritual/personal beliefs that influence decisions regarding their health  Family/Friends No family/friends present    Community:  Patient is connected with a spiritual community and feels well-supported. Support system includes: Children and Extended family  Family/Friends feel well-supported. Support system includes: Extended family    Assessment and Plan of Care:     Patient Interventions include: Facilitated expression of thoughts and feelings  Family/Friends Interventions include: Facilitated expression of thoughts and feelings and Provided sacramental/Faith ritual    Patient Plan of Care: Spiritual Care available upon further referal  Family/Friends Plan of Care: Spiritual Care available upon further referral    Electronically signed by Chaplain Nikko on 12/23/2024 at 5:21 PM       12/23/24 8091   Encounter Summary   Encounter Overview/Reason Spiritual/Emotional Needs;Initial Encounter   Service Provided For Patient   [facility]   Support System

## 2024-12-23 NOTE — PROGRESS NOTES
[facility]  Office: [phone]  Mo Scales DO, Pedro Pablo Cadet DO, Doc Garrett DO, Noel Lyle DO, Livan Beltran MD, Mayra Haider MD, Beverly Payton MD, Devorah Robert MD,  Perico Hirsch MD, Emmy Garcia MD, Reza Vora MD,  Geovany Martell DO, Liana Grissom MD, Jacob Rivera MD, Alvaro Scales DO, Chey Rapp MD,  Yan Morales DO, Margot Morales MD, Nichole Noav MD, Jessica Bergman MD, Cheyenne Quiros MD,  Laurent Houser MD, Rosibel Floyd MD, Randal Shea MD, Matilde Champagne MD, Bryce Carpenter MD, Sophia Haro MD, Adriano Ruiz DO, Chaitanya Ortega MD, Shirley Waterhouse, CNP,  Gregoria Milner, CNP, Adriano Servin, CNP,  Ching Ott, ZOIE, Cathy Hickman, CNP, Alicia Hernandez, CNP, Mirna Ceja, CNP, Katie Koenig, CNP, JOSH FieldsC, JOSH LewisC, Chelsea Au, CNP, Dashawn Ireland, CNP,  Bailey Stephen, CNP, Mariam Tomlinson, CNP,  Yaz Das, CNP, Livier Guzman, CNP         [facility]   IN-PATIENT SERVICE   [facility]    Progress Note    12/23/2024    10:15 AM    Name:   Angelica Guzman  MRN:     [id number]     Acct:      [id number]   Room:   [address] [place] [address] Day:  2  Admit Date:  12/21/2024  5:00 PM    PCP:   Catarino Luciano MD  Code Status:  Full Code    Subjective:     C/C:   Chief Complaint   Patient presents with    Abdominal Pain     Patient c/o increased abd pain that started this morning. Patient states \"she has cancer in her abd, pelvis and liver that she takes pain meds for\". Patient states \" a little while ago she started having some chest pain and shortness of breath\".     Chest Pain    Shortness of Breath     Interval History Status: improved.     Patient seen and examined at bedside this morning.  No acute events overnight.  Resting comfortably in bed.  Would like to try clear liquid diet.  Pain is well-controlled.  Patient is passing flatus.  Denies any nausea or vomiting this morning    Brief History:

## 2024-12-24 VITALS
BODY MASS INDEX: 31.96 KG/M2 | DIASTOLIC BLOOD PRESSURE: 64 MMHG | SYSTOLIC BLOOD PRESSURE: 134 MMHG | TEMPERATURE: 98.1 F | HEIGHT: 65 IN | RESPIRATION RATE: 16 BRPM | HEART RATE: 77 BPM | OXYGEN SATURATION: 98 % | WEIGHT: 191.8 LBS

## 2024-12-24 LAB
GLUCOSE BLD-MCNC: 113 MG/DL (ref 65–105)
GLUCOSE BLD-MCNC: 150 MG/DL (ref 65–105)

## 2024-12-24 PROCEDURE — 2500000003 HC RX 250 WO HCPCS: Performed by: NURSE PRACTITIONER

## 2024-12-24 PROCEDURE — 99239 HOSP IP/OBS DSCHRG MGMT >30: CPT | Performed by: STUDENT IN AN ORGANIZED HEALTH CARE EDUCATION/TRAINING PROGRAM

## 2024-12-24 PROCEDURE — 82947 ASSAY GLUCOSE BLOOD QUANT: CPT

## 2024-12-24 PROCEDURE — 6360000002 HC RX W HCPCS: Performed by: NURSE PRACTITIONER

## 2024-12-24 RX ORDER — INSULIN LISPRO 100 [IU]/ML
0-4 INJECTION, SOLUTION INTRAVENOUS; SUBCUTANEOUS
Status: DISCONTINUED | OUTPATIENT
Start: 2024-12-24 | End: 2024-12-24 | Stop reason: HOSPADM

## 2024-12-24 RX ADMIN — FENTANYL CITRATE 50 MCG: 50 INJECTION, SOLUTION INTRAMUSCULAR; INTRAVENOUS at 10:15

## 2024-12-24 RX ADMIN — SODIUM CHLORIDE, PRESERVATIVE FREE 10 ML: 5 INJECTION INTRAVENOUS at 10:15

## 2024-12-24 RX ADMIN — FENTANYL CITRATE 50 MCG: 50 INJECTION, SOLUTION INTRAMUSCULAR; INTRAVENOUS at 01:15

## 2024-12-24 ASSESSMENT — PAIN DESCRIPTION - ORIENTATION: ORIENTATION: LEFT

## 2024-12-24 ASSESSMENT — PAIN SCALES - GENERAL
PAINLEVEL_OUTOF10: 5
PAINLEVEL_OUTOF10: 1
PAINLEVEL_OUTOF10: 4

## 2024-12-24 ASSESSMENT — PAIN DESCRIPTION - LOCATION
LOCATION: ABDOMEN
LOCATION: ABDOMEN

## 2024-12-24 ASSESSMENT — PAIN - FUNCTIONAL ASSESSMENT: PAIN_FUNCTIONAL_ASSESSMENT: PREVENTS OR INTERFERES SOME ACTIVE ACTIVITIES AND ADLS

## 2024-12-24 ASSESSMENT — PAIN DESCRIPTION - DESCRIPTORS: DESCRIPTORS: ACHING

## 2024-12-24 NOTE — PLAN OF CARE
Problem: Chronic Conditions and Co-morbidities  Goal: Patient's chronic conditions and co-morbidity symptoms are monitored and maintained or improved  12/24/2024 1108 by Taylor Westbrook RN  Outcome: Adequate for Discharge  12/24/2024 1108 by Taylor Westbrook RN  Outcome: Adequate for Discharge  Flowsheets (Taken 12/24/2024 0800)  Care Plan - Patient's Chronic Conditions and Co-Morbidity Symptoms are Monitored and Maintained or Improved:   Monitor and assess patient's chronic conditions and comorbid symptoms for stability, deterioration, or improvement   Collaborate with multidisciplinary team to address chronic and comorbid conditions and prevent exacerbation or deterioration   Update acute care plan with appropriate goals if chronic or comorbid symptoms are exacerbated and prevent overall improvement and discharge  12/24/2024 0134 by Monique Sanchez RN  Outcome: Progressing     Problem: Discharge Planning  Goal: Discharge to home or other facility with appropriate resources  12/24/2024 1108 by Taylor Westbrook RN  Outcome: Adequate for Discharge  12/24/2024 1108 by Taylor Westbrook RN  Outcome: Adequate for Discharge  Flowsheets (Taken 12/24/2024 0800)  Discharge to home or other facility with appropriate resources:   Identify barriers to discharge with patient and caregiver   Arrange for needed discharge resources and transportation as appropriate   Identify discharge learning needs (meds, wound care, etc)  12/24/2024 0134 by Monique Sanchez RN  Outcome: Progressing     Problem: Safety - Adult  Goal: Free from fall injury  12/24/2024 1108 by Taylor Westbrook RN  Outcome: Adequate for Discharge  12/24/2024 1108 by Taylor Westbrook RN  Outcome: Adequate for Discharge  Flowsheets (Taken 12/24/2024 0800)  Free From Fall Injury: Instruct family/caregiver on patient safety  12/24/2024 0134 by Monique Sanchez RN  Outcome: Progressing     Problem: Gastrointestinal - Adult  Goal: Minimal or absence of nausea and  vomiting  12/24/2024 1108 by Taylor Westbrook RN  Outcome: Adequate for Discharge  12/24/2024 1108 by Taylor Westbrook RN  Outcome: Adequate for Discharge  Flowsheets (Taken 12/24/2024 0800)  Minimal or absence of nausea and vomiting:   Advance diet as tolerated, if ordered   Provide nonpharmacologic comfort measures as appropriate  12/24/2024 0134 by Monique Sanchez RN  Outcome: Progressing  Goal: Maintains or returns to baseline bowel function  12/24/2024 1108 by Taylor Westbrook RN  Outcome: Adequate for Discharge  12/24/2024 1108 by Taylor Westbrook RN  Outcome: Adequate for Discharge  Flowsheets (Taken 12/24/2024 0800)  Maintains or returns to baseline bowel function: Assess bowel function  12/24/2024 0134 by Monique Sanchez RN  Outcome: Progressing     Problem: Pain  Goal: Verbalizes/displays adequate comfort level or baseline comfort level  12/24/2024 1108 by Taylor Westbrook RN  Outcome: Adequate for Discharge  12/24/2024 1108 by Taylor Westbrook RN  Outcome: Adequate for Discharge  Flowsheets (Taken 12/24/2024 1015)  Verbalizes/displays adequate comfort level or baseline comfort level:   Encourage patient to monitor pain and request assistance   Administer analgesics based on type and severity of pain and evaluate response   Assess pain using appropriate pain scale   Implement non-pharmacological measures as appropriate and evaluate response  12/24/2024 0134 by Monique Sanchez RN  Outcome: Progressing

## 2024-12-24 NOTE — PLAN OF CARE
Problem: Chronic Conditions and Co-morbidities  Goal: Patient's chronic conditions and co-morbidity symptoms are monitored and maintained or improved  12/24/2024 1108 by Taylor Westbrook RN  Outcome: Adequate for Discharge  Flowsheets (Taken 12/24/2024 0800)  Care Plan - Patient's Chronic Conditions and Co-Morbidity Symptoms are Monitored and Maintained or Improved:   Monitor and assess patient's chronic conditions and comorbid symptoms for stability, deterioration, or improvement   Collaborate with multidisciplinary team to address chronic and comorbid conditions and prevent exacerbation or deterioration   Update acute care plan with appropriate goals if chronic or comorbid symptoms are exacerbated and prevent overall improvement and discharge  12/24/2024 0134 by Monique Sanchez RN  Outcome: Progressing     Problem: Discharge Planning  Goal: Discharge to home or other facility with appropriate resources  12/24/2024 1108 by Taylor Westbrook RN  Outcome: Adequate for Discharge  Flowsheets (Taken 12/24/2024 0800)  Discharge to home or other facility with appropriate resources:   Identify barriers to discharge with patient and caregiver   Arrange for needed discharge resources and transportation as appropriate   Identify discharge learning needs (meds, wound care, etc)  12/24/2024 0134 by Monique Sanchez RN  Outcome: Progressing     Problem: Safety - Adult  Goal: Free from fall injury  12/24/2024 1108 by Taylor Westbrook RN  Outcome: Adequate for Discharge  Flowsheets (Taken 12/24/2024 0800)  Free From Fall Injury: Instruct family/caregiver on patient safety  12/24/2024 0134 by Monique Sanchez RN  Outcome: Progressing     Problem: Gastrointestinal - Adult  Goal: Minimal or absence of nausea and vomiting  12/24/2024 1108 by Taylor Westbrook RN  Outcome: Adequate for Discharge  Flowsheets (Taken 12/24/2024 0800)  Minimal or absence of nausea and vomiting:   Advance diet as tolerated, if ordered   Provide nonpharmacologic

## 2024-12-24 NOTE — CARE COORDINATION
Patient given second IMM in case she is discharged later. Patient continues to deny any discharge needs.

## 2024-12-24 NOTE — PLAN OF CARE
Problem: Chronic Conditions and Co-morbidities  Goal: Patient's chronic conditions and co-morbidity symptoms are monitored and maintained or improved  Outcome: Progressing     Problem: Discharge Planning  Goal: Discharge to home or other facility with appropriate resources  Outcome: Progressing     Problem: Safety - Adult  Goal: Free from fall injury  Outcome: Progressing     Problem: Gastrointestinal - Adult  Goal: Minimal or absence of nausea and vomiting  Outcome: Progressing  Goal: Maintains or returns to baseline bowel function  Outcome: Progressing     Problem: Pain  Goal: Verbalizes/displays adequate comfort level or baseline comfort level  Outcome: Progressing

## 2024-12-24 NOTE — DISCHARGE SUMMARY
[facility]  Office: [phone]  Mo Scales DO, Pedro Pablo Cadet DO, Doc Garrett DO, Noel Lyle DO, Livan Beltran MD, Mayra Haider MD, Beverly Payton MD, Devorah Robert MD,  Perico Hirsch MD, Emmy Garcia MD, Reza Vora MD,  Geovany Martell DO, Liana Grissom MD, Jacob Rivera MD, Alvaro Scales DO, Chey Rapp MD,  Yan Morales DO, Margot Morales MD, Nichole Nova MD, Jessica Bergman MD, Cheyenne Quiros MD,  Laurent Houser MD, Rosibel Floyd MD, Randal Shea MD, Matilde Champagne MD, Bryce Carpenter MD, Sophia Haro MD, Adriano Ruiz DO, Chaitanya Ortega MD, Shirley Waterhouse, CNP,  Gregoria Milner, CNP, Adriano Servin, CNP,  Ching Ott, ZOIE, Cathy Hickman, CNP, Alicia Hernandez, CNP, Mirna Ceja, CNP, Katie Koenig, CNP, Mavis Carney PA-C, Bailey Chandler PA-C, Chelsea Au, CNP, Dashawn Ireland, CNP,  Bailey Stephen, CNP, Mariam Tomlinson, CNP,  Yaz Das, CNP, Livier Guzman, CNP         [facility]   IN-PATIENT SERVICE   [facility]    Discharge Summary     Patient ID: Angelica Guzman  :  1950   MRN: [id number]     ACCOUNT:  [id number]   Patient's PCP: Catarino Luciano MD  Admit Date: 2024   Discharge Date: 2024     Length of Stay: 3  Code Status:  Full Code  Admitting Physician: No admitting provider for patient encounter.  Discharge Physician: Sophia Haro MD     Active Discharge Diagnoses:     Hospital Problem Lists:  Principal Problem:    SBO (small bowel obstruction) (HCC)  Active Problems:    Malignant gastrointestinal stromal tumor (HCC)    Hypercholesterolemia    Essential hypertension    Malignant neoplasm of right female breast (HCC)    Type 2 diabetes mellitus with hypoglycemia without coma, without long-term current use of insulin (HCC)  Resolved Problems:    * No resolved hospital problems. *      Admission Condition:  fair     Discharged Condition: good    Hospital Stay:     Hospital Course:  Angelica

## 2024-12-24 NOTE — PROGRESS NOTES
General Surgery Progress Note            PATIENT NAME: Angelica Guzman     TODAY'S DATE: 12/24/2024, 6:19 AM    SUBJECTIVE:    Patient seen and examined at bedside.  No acute events overnight.  VSS, afebrile.  No significant pain. She is passing flatus, had a bowel movement yesterday. Tolerating clears without any nausea, vomiting, or distention.          OBJECTIVE:   VITALS:  BP (!) 119/55   Pulse 68   Temp 98.3 °F (36.8 °C) (Oral)   Resp 16   Ht 1.651 m (5' 5\")   Wt 87 kg (191 lb 12.8 oz)   SpO2 98%   BMI 31.92 kg/m²      INTAKE/OUTPUT:    No intake or output data in the 24 hours ending 12/24/24 0619    PHYSICAL EXAM  GEN: Alert, awake, no apparent distress   HEENT: normocephalic, no scleral icterus, moist mucous membranes  CV: Regular rate  LUNG: no respiratory distress or accessory muscle use   ABDOMEN: Soft, nondistended, palpable mass in the left lower to mid abdomen.  No tenderness to palpation.   No rebound, guarding, or rigidity.  EXTREMITIES: No edema, cyanosis or clubbing    Data:  CBC with Differential:    Lab Results   Component Value Date/Time    WBC 6.4 12/22/2024 04:45 AM    RBC 3.48 12/22/2024 04:45 AM    RBC 3.86 04/29/2024 08:46 AM    HGB 10.2 12/22/2024 04:45 AM    HCT 30.8 12/22/2024 04:45 AM     12/22/2024 04:45 AM    MCV 88.5 12/22/2024 04:45 AM    MCH 29.2 12/22/2024 04:45 AM    MCHC 33.0 12/22/2024 04:45 AM    RDW 15.9 12/22/2024 04:45 AM    NRBC 0.00 11/19/2024 10:26 AM    LYMPHOPCT 10 12/22/2024 04:45 AM    LYMPHOPCT 13.5 02/08/2022 01:58 PM    MONOPCT 10 12/22/2024 04:45 AM    EOSPCT 4 12/22/2024 04:45 AM    BASOPCT 1 12/22/2024 04:45 AM    MONOSABS 0.60 12/22/2024 04:45 AM    LYMPHSABS 0.60 12/22/2024 04:45 AM    EOSABS 0.20 12/22/2024 04:45 AM    BASOSABS 0.00 12/22/2024 04:45 AM    DIFFTYPE NOT REPORTED 01/17/2022 02:15 PM     BMP:    Lab Results   Component Value Date/Time     12/22/2024 04:45 AM    K 3.8 12/22/2024 04:45 AM     12/22/2024 04:45 AM    CO2 25

## 2024-12-26 ENCOUNTER — CARE COORDINATION (OUTPATIENT)
Dept: CARE COORDINATION | Age: 74
End: 2024-12-26

## 2024-12-26 NOTE — CARE COORDINATION
Care Transitions Note    Initial Call #1 attempt - Call within 2 business days of discharge: Yes    Attempted to reach patient for transitions of care follow up. Unable to reach patient.  If patient is reached, check which is correct PCP - 2 are listed for f/u on AVS    Outreach Attempts:   HIPAA compliant voicemail left for patient.     Patient: Angelica HUGHES Large      Patient : 1950   MRN: [id number]      Reason for Admission: SBO with N/V + ABD pain  Discharge Date: 24    RURS: Readmission Risk Score: 11.6    - PB admission for SBO with N/V + ABD pain.  Treated conservatively with improvement.  Hx metastatic CA.  Plan to f/u with PCP + ONC.     Last Discharge Facility       Date Complaint Diagnosis Description Type Department Provider    24 Abdominal Pain; Chest Pain; Shortness of Breath SBO (small bowel obstruction) (HCC) ED to Hosp-Admission (Discharged) (ADMITTED) [facility] 3 [facility] Sophia Haro MD; Ruthann Borrero ...            Was this an external facility discharge? No    Follow Up Appointment:   Patient does not have hospital follow up appointment scheduled  will check re: correct PCP as 2 different PCPs are listed on [facility] and on AVS.         Plan for follow-up on next business day.      Diann Farley RN

## 2024-12-27 ENCOUNTER — CARE COORDINATION (OUTPATIENT)
Dept: CARE COORDINATION | Age: 74
End: 2024-12-27

## 2024-12-27 DIAGNOSIS — K56.609 SBO (SMALL BOWEL OBSTRUCTION) (HCC): Primary | ICD-10-CM

## 2024-12-27 PROCEDURE — 1111F DSCHRG MED/CURRENT MED MERGE: CPT | Performed by: STUDENT IN AN ORGANIZED HEALTH CARE EDUCATION/TRAINING PROGRAM

## 2024-12-27 NOTE — CARE COORDINATION
Care Transitions Note    Initial Call - Call within 2 business days of discharge: Yes    Patient Current Location:  Home:  [address]  [place] [address]    Care Transition Nurse contacted the patient by telephone to perform post hospital discharge assessment, verified name and  as identifiers. Provided introduction to self, and explanation of the Care Transition Nurse role.     Patient: Angelica HUGHES Large                                   Patient : 1950   MRN: [id number]                               Reason for Admission: SBO with N/V + ABD pain  Discharge Date: 24       RURS: Readmission Risk Score: 11.6     - PB admission for SBO with N/V + ABD pain.  Treated conservatively with improvement.  Hx metastatic CA.  Plan to f/u with new PCP + ONC.      Last Discharge Facility       Date Complaint Diagnosis Description Type Department Provider    24 Abdominal Pain; Chest Pain; Shortness of Breath SBO (small bowel obstruction) (HCC) ED to Hosp-Admission (Discharged) (ADMITTED) [facility] 3 [facility] Sophia Haro MD; Ruthann Borrero ...            Was this an external facility discharge? No    Additional needs identified to be addressed with provider   No needs identified  Oncology appt is scheduled for this week            Method of communication with provider: none.    Patients top risk factors for readmission: medical condition-SBO with N/V + ABD pain, CA with mets and multiple health system providers    Interventions to address risk factors:   Review of patient management of conditions/medications: reviewed  Appts - ONC and getting set up with new PCP [facility] - Catarino Luciano    Care Summary Note:   - PB admission for SBO with N/V + ABD pain.  Treated conservatively with improvement.  Hx metastatic CA.  Plan to f/u with new PCP + ONC.    Angelica returned call  PM and reports doing better, but still some slight nausea and some diarrhea.  Passing gas.  Does complain of being

## 2024-12-28 ENCOUNTER — APPOINTMENT (OUTPATIENT)
Dept: CT IMAGING | Age: 74
End: 2024-12-28
Payer: MEDICARE

## 2024-12-28 ENCOUNTER — HOSPITAL ENCOUNTER (EMERGENCY)
Age: 74
Discharge: HOME | End: 2024-12-28
Attending: EMERGENCY MEDICINE
Payer: MEDICARE

## 2024-12-28 VITALS
HEART RATE: 82 BPM | SYSTOLIC BLOOD PRESSURE: 145 MMHG | DIASTOLIC BLOOD PRESSURE: 69 MMHG | TEMPERATURE: 98.7 F | RESPIRATION RATE: 16 BRPM | OXYGEN SATURATION: 95 %

## 2024-12-28 DIAGNOSIS — R10.9 ABDOMINAL PAIN, UNSPECIFIED ABDOMINAL LOCATION: ICD-10-CM

## 2024-12-28 DIAGNOSIS — R11.0 NAUSEA: Primary | ICD-10-CM

## 2024-12-28 LAB
ALBUMIN SERPL-MCNC: 3.4 G/DL (ref 3.5–5.2)
ALBUMIN/GLOB SERPL: 1.4 {RATIO} (ref 1–2.5)
ALP SERPL-CCNC: 79 U/L (ref 35–104)
ALT SERPL-CCNC: 9 U/L (ref 5–33)
AMYLASE SERPL-CCNC: 21 U/L (ref 28–100)
ANION GAP SERPL CALCULATED.3IONS-SCNC: 10 MMOL/L (ref 9–17)
AST SERPL-CCNC: 12 U/L
BACTERIA URNS QL MICRO: ABNORMAL
BASOPHILS # BLD: 0 K/UL (ref 0–0.2)
BASOPHILS NFR BLD: 0 % (ref 0–2)
BILIRUB DIRECT SERPL-MCNC: 0.1 MG/DL
BILIRUB INDIRECT SERPL-MCNC: 0.4 MG/DL (ref 0–1)
BILIRUB SERPL-MCNC: 0.5 MG/DL (ref 0.3–1.2)
BILIRUB UR QL STRIP: NEGATIVE
BUN SERPL-MCNC: 9 MG/DL (ref 8–23)
CALCIUM SERPL-MCNC: 8.4 MG/DL (ref 8.6–10.4)
CHARACTER UR: ABNORMAL
CHLORIDE SERPL-SCNC: 103 MMOL/L (ref 98–107)
CLARITY UR: CLEAR
CO2 SERPL-SCNC: 26 MMOL/L (ref 20–31)
COLOR UR: YELLOW
CREAT SERPL-MCNC: 0.6 MG/DL (ref 0.5–0.9)
EOSINOPHIL # BLD: 0.2 K/UL (ref 0–0.4)
EOSINOPHILS RELATIVE PERCENT: 2 % (ref 1–4)
EPI CELLS #/AREA URNS HPF: ABNORMAL /HPF (ref 0–5)
ERYTHROCYTE [DISTWIDTH] IN BLOOD BY AUTOMATED COUNT: 16.8 % (ref 12.5–15.4)
GFR, ESTIMATED: >90 ML/MIN/1.73M2
GLUCOSE SERPL-MCNC: 123 MG/DL (ref 70–99)
GLUCOSE UR STRIP-MCNC: ABNORMAL MG/DL
HCT VFR BLD AUTO: 32 % (ref 36–46)
HGB BLD-MCNC: 10.5 G/DL (ref 12–16)
HGB UR QL STRIP.AUTO: ABNORMAL
KETONES UR STRIP-MCNC: NEGATIVE MG/DL
LACTATE BLDV-SCNC: 0.7 MMOL/L (ref 0.5–1.9)
LEUKOCYTE ESTERASE UR QL STRIP: NEGATIVE
LIPASE SERPL-CCNC: 10 U/L (ref 13–60)
LYMPHOCYTES NFR BLD: 0.8 K/UL (ref 1–4.8)
LYMPHOCYTES RELATIVE PERCENT: 11 % (ref 24–44)
MCH RBC QN AUTO: 29.2 PG (ref 26–34)
MCHC RBC AUTO-ENTMCNC: 32.9 G/DL (ref 31–37)
MCV RBC AUTO: 88.8 FL (ref 80–100)
MONOCYTES NFR BLD: 0.8 K/UL (ref 0.1–1.2)
MONOCYTES NFR BLD: 10 % (ref 2–11)
NEUTROPHILS NFR BLD: 77 % (ref 36–66)
NEUTS SEG NFR BLD: 6 K/UL (ref 1.8–7.7)
NITRITE UR QL STRIP: NEGATIVE
PH UR STRIP: 7.5 [PH] (ref 5–8)
PLATELET # BLD AUTO: 263 K/UL (ref 140–450)
PMV BLD AUTO: 6.4 FL (ref 6–12)
POTASSIUM SERPL-SCNC: 3.5 MMOL/L (ref 3.7–5.3)
PROT SERPL-MCNC: 5.8 G/DL (ref 6.4–8.3)
PROT UR STRIP-MCNC: NEGATIVE MG/DL
RBC # BLD AUTO: 3.6 M/UL (ref 4–5.2)
RBC #/AREA URNS HPF: ABNORMAL /HPF (ref 0–2)
SODIUM SERPL-SCNC: 139 MMOL/L (ref 135–144)
SP GR UR STRIP: 1.01 (ref 1–1.03)
TROPONIN I SERPL HS-MCNC: 20 NG/L (ref 0–14)
UROBILINOGEN UR STRIP-ACNC: NORMAL EU/DL (ref 0–1)
WBC #/AREA URNS HPF: ABNORMAL /HPF (ref 0–5)
WBC OTHER # BLD: 7.8 K/UL (ref 3.5–11)

## 2024-12-28 PROCEDURE — 96374 THER/PROPH/DIAG INJ IV PUSH: CPT

## 2024-12-28 PROCEDURE — 83690 ASSAY OF LIPASE: CPT

## 2024-12-28 PROCEDURE — 93005 ELECTROCARDIOGRAM TRACING: CPT | Performed by: EMERGENCY MEDICINE

## 2024-12-28 PROCEDURE — 2500000003 HC RX 250 WO HCPCS: Performed by: EMERGENCY MEDICINE

## 2024-12-28 PROCEDURE — 96375 TX/PRO/DX INJ NEW DRUG ADDON: CPT

## 2024-12-28 PROCEDURE — 82150 ASSAY OF AMYLASE: CPT

## 2024-12-28 PROCEDURE — 99285 EMERGENCY DEPT VISIT HI MDM: CPT

## 2024-12-28 PROCEDURE — 80048 BASIC METABOLIC PNL TOTAL CA: CPT

## 2024-12-28 PROCEDURE — 80076 HEPATIC FUNCTION PANEL: CPT

## 2024-12-28 PROCEDURE — 81001 URINALYSIS AUTO W/SCOPE: CPT

## 2024-12-28 PROCEDURE — 6360000002 HC RX W HCPCS: Performed by: EMERGENCY MEDICINE

## 2024-12-28 PROCEDURE — 83605 ASSAY OF LACTIC ACID: CPT

## 2024-12-28 PROCEDURE — 85025 COMPLETE CBC W/AUTO DIFF WBC: CPT

## 2024-12-28 PROCEDURE — 84484 ASSAY OF TROPONIN QUANT: CPT

## 2024-12-28 PROCEDURE — 74177 CT ABD & PELVIS W/CONTRAST: CPT

## 2024-12-28 PROCEDURE — 6360000004 HC RX CONTRAST MEDICATION: Performed by: EMERGENCY MEDICINE

## 2024-12-28 RX ORDER — IOPAMIDOL 755 MG/ML
75 INJECTION, SOLUTION INTRAVASCULAR
Status: COMPLETED | OUTPATIENT
Start: 2024-12-28 | End: 2024-12-28

## 2024-12-28 RX ORDER — SODIUM CHLORIDE 0.9 % (FLUSH) 0.9 %
10 SYRINGE (ML) INJECTION PRN
Status: DISCONTINUED | OUTPATIENT
Start: 2024-12-28 | End: 2024-12-28 | Stop reason: HOSPADM

## 2024-12-28 RX ORDER — ONDANSETRON 2 MG/ML
4 INJECTION INTRAMUSCULAR; INTRAVENOUS ONCE
Status: COMPLETED | OUTPATIENT
Start: 2024-12-28 | End: 2024-12-28

## 2024-12-28 RX ORDER — ONDANSETRON 4 MG/1
4 TABLET, FILM COATED ORAL EVERY 8 HOURS PRN
Qty: 20 TABLET | Refills: 0 | Status: SHIPPED | OUTPATIENT
Start: 2024-12-28

## 2024-12-28 RX ORDER — FENTANYL CITRATE 50 UG/ML
50 INJECTION, SOLUTION INTRAMUSCULAR; INTRAVENOUS ONCE
Status: COMPLETED | OUTPATIENT
Start: 2024-12-28 | End: 2024-12-28

## 2024-12-28 RX ORDER — 0.9 % SODIUM CHLORIDE 0.9 %
80 INTRAVENOUS SOLUTION INTRAVENOUS ONCE
Status: DISCONTINUED | OUTPATIENT
Start: 2024-12-28 | End: 2024-12-28 | Stop reason: HOSPADM

## 2024-12-28 RX ADMIN — FENTANYL CITRATE 50 MCG: 50 INJECTION, SOLUTION INTRAMUSCULAR; INTRAVENOUS at 09:26

## 2024-12-28 RX ADMIN — ONDANSETRON 4 MG: 2 INJECTION INTRAMUSCULAR; INTRAVENOUS at 09:25

## 2024-12-28 RX ADMIN — Medication 80 ML: at 09:59

## 2024-12-28 RX ADMIN — SODIUM CHLORIDE, PRESERVATIVE FREE 10 ML: 5 INJECTION INTRAVENOUS at 10:00

## 2024-12-28 RX ADMIN — IOPAMIDOL 75 ML: 755 INJECTION, SOLUTION INTRAVENOUS at 10:00

## 2024-12-28 ASSESSMENT — PAIN - FUNCTIONAL ASSESSMENT: PAIN_FUNCTIONAL_ASSESSMENT: 0-10

## 2024-12-28 ASSESSMENT — PAIN SCALES - GENERAL
PAINLEVEL_OUTOF10: 3
PAINLEVEL_OUTOF10: 6
PAINLEVEL_OUTOF10: 6

## 2024-12-28 NOTE — DISCHARGE INSTRUCTIONS
Take medications as prescribed      Return immediately if any worsening symptoms or any other concerns    Please understand that early in the process of an illness or injury, an emergency department workup can be falsely reassuring.      Tell us how we did visit: [email or web address]   and let us know about your experience

## 2024-12-28 NOTE — ED PROVIDER NOTES
[facility] Emergency Department  [address]  [place] [address]  Phone: [phone]  Fax: [phone]  EMERGENCY DEPARTMENT ENCOUNTER      Pt Name: Angelica Guzman  MRN: [id number]  Birthdate 1950  Date of evaluation: 12/28/2024    CHIEF COMPLAINT       Chief Complaint   Patient presents with    Abdominal Pain     Arrives from home with c/o abd pain that started in the night. Reports was discharged 12/24 from this hospital; had SBO. Denies recent abd surgeries. Reports constipation. Denies urinary symptoms or fevers. Reports nausea; no vomiting.        HISTORY OF PRESENT ILLNESS    Angelica Guzman is a 74 y.o. female who presents to the emergency room complaining of nausea and abdominal pain.  Epigastric abdominal pain nonradiating.  Patient was recently admitted for small bowel obstruction discharged on the 24th for the same.  Conservative management at that time.  She said she went home and felt fine.  Last night she had cut up some steak which she says she should have had.  Since then she has had the abdominal pain and bloating.  She denies diarrhea.  No vomiting just severe nausea.  She started passing gas this morning but no stool.  No previous abdominal surgeries.  Denies any other relevant symptoms.  She does have a history of malignant gastrointestinal stromal tumor with mets to the liver diagnosed 3 years ago.  Currently on oral chemotherapy.  She took a Norco yesterday.  Does not have anything for nausea at home.  No chest pain or shortness of breath no fevers or chills.    REVIEW OF SYSTEMS       Constitutional: No fevers or chills   HENT: No sore throat, rhinorrhea, or earache   Eyes: No blurry vision or double vision no drainage   Cardiovascular: No chest pain or tachycardia   Respiratory: No wheezing or shortness of breath no cough   Gastrointestinal: Positive nausea no vomiting or diarrhea positive abdominal pain and bloating  : No hematuria or dysuria   Musculoskeletal: No  There was voiced understanding of these instructions and no further questions or complaints.    FINAL IMPRESSION      1. Nausea    2. Abdominal pain, unspecified abdominal location          DISPOSITION/PLAN   DISPOSITION Decision To Discharge 12/28/2024 11:10:12 AM   DISPOSITION CONDITION Stable         CONDITION ON DISPOSITION: See chart     PATIENT REFERRED TO:  Catarino Luciano MD  [address]  [address]  [place] [address]  [phone]    Schedule an appointment as soon as possible for a visit in 3 days        DISCHARGE MEDICATIONS:  New Prescriptions    ONDANSETRON (ZOFRAN) 4 MG TABLET    Take 1 tablet by mouth every 8 hours as needed for Nausea       (Please note that portions of this note were completed with a voice recognition program.  Efforts were made to edit the dictations but occasionally words are mis-transcribed.  Additionally, portions of this note may also include information that was incorporated after care transfer to another provider that were not available at the time of my evaluation.  Some of this information could likely include laboratory values, vital sign updates, medications etc.)    Edgard Vergara DO   Attending Emergency Physician        Edgard Vergara DO  12/28/24 [id number]

## 2024-12-29 LAB
EKG ATRIAL RATE: 70 BPM
EKG P AXIS: 17 DEGREES
EKG P-R INTERVAL: 148 MS
EKG Q-T INTERVAL: 404 MS
EKG QRS DURATION: 78 MS
EKG QTC CALCULATION (BAZETT): 436 MS
EKG R AXIS: 4 DEGREES
EKG VENTRICULAR RATE: 70 BPM

## 2024-12-29 PROCEDURE — 93010 ELECTROCARDIOGRAM REPORT: CPT | Performed by: INTERNAL MEDICINE

## 2024-12-31 ENCOUNTER — CARE COORDINATION (OUTPATIENT)
Dept: CARE COORDINATION | Age: 74
End: 2024-12-31

## 2024-12-31 NOTE — CARE COORDINATION
Care Transitions Note    Follow Up Call     Attempted to reach patient for transitions of care follow up.  Unable to reach patient.      Outreach Attempts:   Multiple attempts to contact patient at phone numbers on file.     Care Summary Note: Patient was in the ED for abdominal pain and nausea, was discharged from ED with script for Zofran. Unable to reach today, left  requesting return call.     Follow Up Appointment:       Plan for follow-up call in 6-10 days based on severity of symptoms and risk factors. Plan for next call:  Check on abdominal pain, nausea. How are bowels doing?     Tiffany Jeff RN

## 2025-01-03 ENCOUNTER — CARE COORDINATION (OUTPATIENT)
Dept: CARE COORDINATION | Age: 75
End: 2025-01-03

## 2025-01-03 NOTE — CARE COORDINATION
Bedside and Verbal shift change report given to Phillips RN (oncoming nurse) by Aftab Holloway RN (offgoing nurse). Report included the following information SBAR, Kardex, MAR and Recent Results. SITUATION:  Code Status: Full Code  Reason for Admission: Bupropion overdose, intentional self-harm, initial encounter ([facility] [address]  Bupropion overdose, intentional self-harm, initial encounter ([facility] [address]  Bupropion overdose, intentional self-harm, initial encounter [facility])  Hospital day: 1  Problem List:       Hospital Problems  Date Reviewed: 3/4/2018          Codes Class Noted POA    * (Principal)Bupropion overdose ICD-10-CM: T43.291A  ICD-9-CM: 969.00, E980.3  3/4/2018 Yes        Suicidal ideation ICD-10-CM: R45.851  ICD-9-CM: V62.84  3/4/2018 Unknown        Depression ICD-10-CM: F32.9  ICD-9-CM: 834  3/4/2018 Unknown              BACKGROUND:   Past Medical History:   Past Medical History:   Diagnosis Date    Depression       Patient taking anticoagulants no    Patient has a defibrillator: no    History of shots NO for example, flu, pneumonia, tetanus   Isolation History NO for example, MRSA, CDiff    ASSESSMENT:  Changes in Assessment Throughout Shift: NONE  Significant Changes in 24 hours (for example, RR/code, fall)  Patient has Central Line: no Reasons if yes:   Patient has Palmer Cath: no Reasons if yes:     Mobility Issues  PT  IV Patency  OR Checklist  Pending Tests    Last Vitals:  Vitals w/ MEWS Score (last day)     Date/Time MEWS Score Pulse Resp Temp BP Level of Consciousness SpO2    03/05/18 0400 2 67 17 97.2 °F (36.2 °C) 98/64 Alert 97 %    03/04/18 2305 1 72 16 97.1 °F (36.2 °C) 115/70 Alert 98 %    03/04/18 1945 1 86 16 99.2 °F (37.3 °C) 125/78 Alert 97 %    03/04/18 1600 1 79 16 98.2 °F (36.8 °C) 112/80 Alert 97 %    03/04/18 1227 1 93 18 98.2 °F (36.8 °C) 123/84 Alert 99 %    03/04/18 0751 3 (!)  114 20 97.7 °F (36.5 °C) 105/67 Alert 97 %    03/04/18 0255 2 (!)  110 18 97.4 °F (36.3 °C) 117/74 Alert 97 % Care Transitions Note    Follow Up Call     Attempted to reach patient for transitions of care follow up.  Unable to reach patient.      Outreach Attempts:   Multiple attempts to contact patient at phone numbers on file.   HIPAA compliant voicemail left for patient.     Care Summary Note: 2nd attempt-will end care transitions if no return call received.         No further follow-up call indicated based on severity of symptoms and risk factors.    Rebecca Coughlin LPN        03/04/18 0200 -- 94 15 -- 113/72 -- 97 %    03/04/18 0145 -- (!)  105 20 -- 121/79 -- 99 %    03/04/18 0130 -- 96 16 -- 114/75 -- 98 %    03/04/18 0115 -- 90 14 -- 122/69 -- 97 %    03/04/18 0100 -- (!)  101 16 -- 122/84 -- 98 %    03/04/18 0045 -- -- -- -- 123/74 -- 96 %            PAIN    Pain Assessment    Pain Intensity 1: 0 (03/05/18 0400)              Patient Stated Pain Goal: 0  Intervention effective: yes  Time of last intervention: 3/5/18 Reassessment Completed: yes   Other actions taken for pain: Distraction    Last 3 Weights: There were no vitals filed for this visit. Weight change:     INTAKE/OUPUT    Current Shift:      Last three shifts: 03/03 1901 - 03/05 0700  In: 952.5 [I.V.:952.5]  Out: 1995 [id number]    RECOMMENDATIONS AND DISCHARGE PLANNING  Patient needs and requests: none    Pending tests/procedures: Labs     Discharge plan for patient: TBD    Discharge planning Needs or Barriers: None    Estimated Discharge Date: TBD Posted on Whiteboard in Patients Room: yes       \"HEALS\" SAFETY CHECK  A safety check occurred in the patient's room between off going nurse and oncoming nurse listed above. The safety check included the below items:    H  High Alert Medications Verify all high alert medication drips (heparin, PCA, etc.)  E  Equipment Suction is set up for ALL patients (with stephyker)  Red plugs utilized for all equipment (IV pumps, etc.)  WOWs wiped down at end of shift. Room stocked with oxygen, suction, and other unit-specific supplies  A  Alarms Bed alarm is set for fall risk patients  Ensure chair alarm is in place and activated if patient is up in a chair  L  Lines Check IV for any infiltration  Palmer bag is empty if patient has a Palmer   Tubing and IV bags are labeled  S  Safety  Room is clean, patient is clean, and equipment is clean. Hallways are clear from equipment besides carts.    Fall bracelet on for fall risk patients  Ensure room is clear and free of clutter  Suction is set up for ALL patients (with antonia)  Hallways are clear from equipment besides carts.    Isolation precautions followed, supplies available outside room, sign posted    Alice Davalos RN

## 2025-03-26 LAB — URINE CULTURE, ROUTINE: NORMAL STATUS

## 2025-03-27 ENCOUNTER — HOSPITAL ENCOUNTER (INPATIENT)
Age: 75
LOS: 4 days | Discharge: HOME | DRG: 389 | End: 2025-04-01
Attending: STUDENT IN AN ORGANIZED HEALTH CARE EDUCATION/TRAINING PROGRAM | Admitting: INTERNAL MEDICINE
Payer: MEDICARE

## 2025-03-27 DIAGNOSIS — K56.600 PARTIAL SMALL BOWEL OBSTRUCTION (HCC): Primary | ICD-10-CM

## 2025-03-27 LAB — GLUCOSE BLD-MCNC: 174 MG/DL (ref 65–105)

## 2025-03-27 PROCEDURE — 85025 COMPLETE CBC W/AUTO DIFF WBC: CPT

## 2025-03-27 PROCEDURE — 83605 ASSAY OF LACTIC ACID: CPT

## 2025-03-27 PROCEDURE — 84484 ASSAY OF TROPONIN QUANT: CPT

## 2025-03-27 PROCEDURE — 80076 HEPATIC FUNCTION PANEL: CPT

## 2025-03-27 PROCEDURE — 93005 ELECTROCARDIOGRAM TRACING: CPT | Performed by: NURSE PRACTITIONER

## 2025-03-27 PROCEDURE — 80048 BASIC METABOLIC PNL TOTAL CA: CPT

## 2025-03-27 PROCEDURE — 99285 EMERGENCY DEPT VISIT HI MDM: CPT

## 2025-03-27 PROCEDURE — 82947 ASSAY GLUCOSE BLOOD QUANT: CPT

## 2025-03-27 PROCEDURE — 83690 ASSAY OF LIPASE: CPT

## 2025-03-27 RX ORDER — ONDANSETRON 2 MG/ML
4 INJECTION INTRAMUSCULAR; INTRAVENOUS ONCE
Status: COMPLETED | OUTPATIENT
Start: 2025-03-28 | End: 2025-03-28

## 2025-03-27 RX ORDER — FENTANYL CITRATE 50 UG/ML
25 INJECTION, SOLUTION INTRAMUSCULAR; INTRAVENOUS ONCE
Refills: 0 | Status: COMPLETED | OUTPATIENT
Start: 2025-03-28 | End: 2025-03-28

## 2025-03-27 RX ORDER — 0.9 % SODIUM CHLORIDE 0.9 %
1000 INTRAVENOUS SOLUTION INTRAVENOUS ONCE
Status: COMPLETED | OUTPATIENT
Start: 2025-03-28 | End: 2025-03-28

## 2025-03-27 ASSESSMENT — PAIN - FUNCTIONAL ASSESSMENT: PAIN_FUNCTIONAL_ASSESSMENT: 0-10

## 2025-03-27 ASSESSMENT — PAIN DESCRIPTION - LOCATION: LOCATION: ABDOMEN

## 2025-03-27 ASSESSMENT — PAIN SCALES - GENERAL: PAINLEVEL_OUTOF10: 9

## 2025-03-27 ASSESSMENT — PAIN DESCRIPTION - PAIN TYPE: TYPE: ACUTE PAIN

## 2025-03-28 ENCOUNTER — APPOINTMENT (OUTPATIENT)
Dept: GENERAL RADIOLOGY | Age: 75
DRG: 389 | End: 2025-03-28
Payer: MEDICARE

## 2025-03-28 ENCOUNTER — APPOINTMENT (OUTPATIENT)
Dept: CT IMAGING | Age: 75
DRG: 389 | End: 2025-03-28
Payer: MEDICARE

## 2025-03-28 PROBLEM — K56.600 PARTIAL SMALL BOWEL OBSTRUCTION (HCC): Status: ACTIVE | Noted: 2025-03-28

## 2025-03-28 LAB
ALBUMIN SERPL-MCNC: 4.1 G/DL (ref 3.5–5.2)
ALBUMIN/GLOB SERPL: 1.2 {RATIO} (ref 1–2.5)
ALP SERPL-CCNC: 98 U/L (ref 35–104)
ALT SERPL-CCNC: 9 U/L (ref 5–33)
ANION GAP SERPL CALCULATED.3IONS-SCNC: 18 MMOL/L (ref 9–17)
AST SERPL-CCNC: 19 U/L
BACTERIA URNS QL MICRO: ABNORMAL
BASOPHILS # BLD: 0 K/UL (ref 0–0.2)
BASOPHILS NFR BLD: 0 % (ref 0–2)
BILIRUB DIRECT SERPL-MCNC: 0.1 MG/DL
BILIRUB INDIRECT SERPL-MCNC: 0.4 MG/DL (ref 0–1)
BILIRUB SERPL-MCNC: 0.5 MG/DL (ref 0.3–1.2)
BILIRUB UR QL STRIP: NEGATIVE
BUN SERPL-MCNC: 11 MG/DL (ref 8–23)
CALCIUM SERPL-MCNC: 9.1 MG/DL (ref 8.6–10.4)
CHARACTER UR: ABNORMAL
CHLORIDE SERPL-SCNC: 98 MMOL/L (ref 98–107)
CLARITY UR: CLEAR
CO2 SERPL-SCNC: 22 MMOL/L (ref 20–31)
COLOR UR: YELLOW
COMMENT: ABNORMAL
CREAT SERPL-MCNC: 0.9 MG/DL (ref 0.5–0.9)
EKG ATRIAL RATE: 86 BPM
EKG P AXIS: 11 DEGREES
EKG P-R INTERVAL: 120 MS
EKG Q-T INTERVAL: 416 MS
EKG QRS DURATION: 72 MS
EKG QTC CALCULATION (BAZETT): 497 MS
EKG R AXIS: 9 DEGREES
EKG T AXIS: 30 DEGREES
EKG VENTRICULAR RATE: 86 BPM
EOSINOPHIL # BLD: 0.1 K/UL (ref 0–0.4)
EOSINOPHILS RELATIVE PERCENT: 1 % (ref 1–4)
EPI CELLS #/AREA URNS HPF: ABNORMAL /HPF (ref 0–5)
ERYTHROCYTE [DISTWIDTH] IN BLOOD BY AUTOMATED COUNT: 17.1 % (ref 12.5–15.4)
GFR, ESTIMATED: 68 ML/MIN/1.73M2
GLUCOSE SERPL-MCNC: 183 MG/DL (ref 70–99)
GLUCOSE UR STRIP-MCNC: ABNORMAL MG/DL
HCT VFR BLD AUTO: 36.6 % (ref 36–46)
HGB BLD-MCNC: 12.1 G/DL (ref 12–16)
HGB UR QL STRIP.AUTO: ABNORMAL
KETONES UR STRIP-MCNC: ABNORMAL MG/DL
LACTATE BLDV-SCNC: 0.6 MMOL/L (ref 0.5–2.2)
LACTATE BLDV-SCNC: 0.9 MMOL/L (ref 0.5–2.2)
LACTATE BLDV-SCNC: 3.4 MMOL/L (ref 0.5–2.2)
LEUKOCYTE ESTERASE UR QL STRIP: ABNORMAL
LIPASE SERPL-CCNC: 45 U/L (ref 13–60)
LYMPHOCYTES NFR BLD: 1.6 K/UL (ref 1–4.8)
LYMPHOCYTES RELATIVE PERCENT: 16 % (ref 24–44)
MCH RBC QN AUTO: 29.1 PG (ref 26–34)
MCHC RBC AUTO-ENTMCNC: 33 G/DL (ref 31–37)
MCV RBC AUTO: 88 FL (ref 80–100)
MONOCYTES NFR BLD: 0.7 K/UL (ref 0.1–1.2)
MONOCYTES NFR BLD: 6 % (ref 2–11)
NEUTROPHILS NFR BLD: 77 % (ref 36–66)
NEUTS SEG NFR BLD: 8 K/UL (ref 1.8–7.7)
NITRITE UR QL STRIP: NEGATIVE
PH UR STRIP: 7 [PH] (ref 5–8)
PLATELET # BLD AUTO: 392 K/UL (ref 140–450)
PMV BLD AUTO: 7.2 FL (ref 6–12)
POTASSIUM SERPL-SCNC: 3.7 MMOL/L (ref 3.7–5.3)
PROT SERPL-MCNC: 7.4 G/DL (ref 6.4–8.3)
PROT UR STRIP-MCNC: NEGATIVE MG/DL
RBC # BLD AUTO: 4.15 M/UL (ref 4–5.2)
RBC #/AREA URNS HPF: ABNORMAL /HPF (ref 0–2)
SODIUM SERPL-SCNC: 138 MMOL/L (ref 135–144)
SP GR UR STRIP: 1.06 (ref 1–1.03)
TROPONIN I SERPL HS-MCNC: 25 NG/L (ref 0–14)
TROPONIN I SERPL HS-MCNC: 29 NG/L (ref 0–14)
UROBILINOGEN UR STRIP-ACNC: NORMAL EU/DL (ref 0–1)
WBC #/AREA URNS HPF: ABNORMAL /HPF (ref 0–5)
WBC OTHER # BLD: 10.4 K/UL (ref 3.5–11)

## 2025-03-28 PROCEDURE — 2580000003 HC RX 258

## 2025-03-28 PROCEDURE — 2500000003 HC RX 250 WO HCPCS: Performed by: NURSE PRACTITIONER

## 2025-03-28 PROCEDURE — 1200000000 HC SEMI PRIVATE

## 2025-03-28 PROCEDURE — 74018 RADEX ABDOMEN 1 VIEW: CPT

## 2025-03-28 PROCEDURE — 96375 TX/PRO/DX INJ NEW DRUG ADDON: CPT

## 2025-03-28 PROCEDURE — 99222 1ST HOSP IP/OBS MODERATE 55: CPT | Performed by: HOSPITALIST

## 2025-03-28 PROCEDURE — 6370000000 HC RX 637 (ALT 250 FOR IP)

## 2025-03-28 PROCEDURE — APPSS45 APP SPLIT SHARED TIME 31-45 MINUTES

## 2025-03-28 PROCEDURE — 6360000002 HC RX W HCPCS: Performed by: STUDENT IN AN ORGANIZED HEALTH CARE EDUCATION/TRAINING PROGRAM

## 2025-03-28 PROCEDURE — 6360000002 HC RX W HCPCS

## 2025-03-28 PROCEDURE — 36415 COLL VENOUS BLD VENIPUNCTURE: CPT

## 2025-03-28 PROCEDURE — 2580000003 HC RX 258: Performed by: NURSE PRACTITIONER

## 2025-03-28 PROCEDURE — 93010 ELECTROCARDIOGRAM REPORT: CPT | Performed by: STUDENT IN AN ORGANIZED HEALTH CARE EDUCATION/TRAINING PROGRAM

## 2025-03-28 PROCEDURE — 96376 TX/PRO/DX INJ SAME DRUG ADON: CPT

## 2025-03-28 PROCEDURE — 74177 CT ABD & PELVIS W/CONTRAST: CPT

## 2025-03-28 PROCEDURE — 81001 URINALYSIS AUTO W/SCOPE: CPT

## 2025-03-28 PROCEDURE — 6360000002 HC RX W HCPCS: Performed by: NURSE PRACTITIONER

## 2025-03-28 PROCEDURE — 6360000004 HC RX CONTRAST MEDICATION: Performed by: NURSE PRACTITIONER

## 2025-03-28 PROCEDURE — 71045 X-RAY EXAM CHEST 1 VIEW: CPT

## 2025-03-28 PROCEDURE — 96374 THER/PROPH/DIAG INJ IV PUSH: CPT

## 2025-03-28 RX ORDER — 0.9 % SODIUM CHLORIDE 0.9 %
80 INTRAVENOUS SOLUTION INTRAVENOUS ONCE
Status: DISCONTINUED | OUTPATIENT
Start: 2025-03-28 | End: 2025-04-01 | Stop reason: HOSPADM

## 2025-03-28 RX ORDER — ACETAMINOPHEN 650 MG/1
650 SUPPOSITORY RECTAL EVERY 6 HOURS PRN
Status: DISCONTINUED | OUTPATIENT
Start: 2025-03-28 | End: 2025-04-01 | Stop reason: HOSPADM

## 2025-03-28 RX ORDER — POTASSIUM CHLORIDE 7.45 MG/ML
10 INJECTION INTRAVENOUS PRN
Status: DISCONTINUED | OUTPATIENT
Start: 2025-03-28 | End: 2025-04-01 | Stop reason: HOSPADM

## 2025-03-28 RX ORDER — IOPAMIDOL 755 MG/ML
75 INJECTION, SOLUTION INTRAVASCULAR
Status: COMPLETED | OUTPATIENT
Start: 2025-03-28 | End: 2025-03-28

## 2025-03-28 RX ORDER — MAGNESIUM SULFATE 1 G/100ML
1000 INJECTION INTRAVENOUS PRN
Status: DISCONTINUED | OUTPATIENT
Start: 2025-03-28 | End: 2025-04-01 | Stop reason: HOSPADM

## 2025-03-28 RX ORDER — ONDANSETRON 2 MG/ML
4 INJECTION INTRAMUSCULAR; INTRAVENOUS EVERY 6 HOURS PRN
Status: DISCONTINUED | OUTPATIENT
Start: 2025-03-28 | End: 2025-04-01 | Stop reason: HOSPADM

## 2025-03-28 RX ORDER — SODIUM CHLORIDE 9 MG/ML
INJECTION, SOLUTION INTRAVENOUS CONTINUOUS
Status: ACTIVE | OUTPATIENT
Start: 2025-03-28 | End: 2025-03-29

## 2025-03-28 RX ORDER — POTASSIUM CHLORIDE 1500 MG/1
40 TABLET, EXTENDED RELEASE ORAL PRN
Status: DISCONTINUED | OUTPATIENT
Start: 2025-03-28 | End: 2025-04-01 | Stop reason: HOSPADM

## 2025-03-28 RX ORDER — OXYCODONE HYDROCHLORIDE 5 MG/1
10 TABLET ORAL EVERY 4 HOURS PRN
COMMUNITY

## 2025-03-28 RX ORDER — BUPROPION HYDROCHLORIDE 150 MG/1
150 TABLET ORAL EVERY MORNING
Status: DISCONTINUED | OUTPATIENT
Start: 2025-03-28 | End: 2025-04-01 | Stop reason: HOSPADM

## 2025-03-28 RX ORDER — OXYCODONE HYDROCHLORIDE 100 MG/5ML
0.5 SOLUTION ORAL EVERY 6 HOURS PRN
Status: DISCONTINUED | OUTPATIENT
Start: 2025-03-28 | End: 2025-03-28

## 2025-03-28 RX ORDER — GLIPIZIDE 5 MG/1
5 TABLET ORAL
Status: DISCONTINUED | OUTPATIENT
Start: 2025-03-28 | End: 2025-04-01 | Stop reason: HOSPADM

## 2025-03-28 RX ORDER — ONDANSETRON 4 MG/1
4 TABLET, ORALLY DISINTEGRATING ORAL EVERY 8 HOURS PRN
Status: DISCONTINUED | OUTPATIENT
Start: 2025-03-28 | End: 2025-04-01 | Stop reason: HOSPADM

## 2025-03-28 RX ORDER — FENTANYL CITRATE 50 UG/ML
50 INJECTION, SOLUTION INTRAMUSCULAR; INTRAVENOUS ONCE
Status: COMPLETED | OUTPATIENT
Start: 2025-03-28 | End: 2025-03-28

## 2025-03-28 RX ORDER — ACETAMINOPHEN 325 MG/1
650 TABLET ORAL EVERY 6 HOURS PRN
Status: DISCONTINUED | OUTPATIENT
Start: 2025-03-28 | End: 2025-04-01 | Stop reason: HOSPADM

## 2025-03-28 RX ORDER — SODIUM CHLORIDE 0.9 % (FLUSH) 0.9 %
5-40 SYRINGE (ML) INJECTION EVERY 12 HOURS SCHEDULED
Status: DISCONTINUED | OUTPATIENT
Start: 2025-03-28 | End: 2025-04-01 | Stop reason: HOSPADM

## 2025-03-28 RX ORDER — SODIUM CHLORIDE 9 MG/ML
INJECTION, SOLUTION INTRAVENOUS PRN
Status: DISCONTINUED | OUTPATIENT
Start: 2025-03-28 | End: 2025-04-01 | Stop reason: HOSPADM

## 2025-03-28 RX ORDER — SODIUM CHLORIDE 0.9 % (FLUSH) 0.9 %
10 SYRINGE (ML) INJECTION ONCE
Status: COMPLETED | OUTPATIENT
Start: 2025-03-28 | End: 2025-03-28

## 2025-03-28 RX ORDER — POLYETHYLENE GLYCOL 3350 17 G/17G
17 POWDER, FOR SOLUTION ORAL DAILY PRN
Status: DISCONTINUED | OUTPATIENT
Start: 2025-03-28 | End: 2025-04-01 | Stop reason: HOSPADM

## 2025-03-28 RX ORDER — SODIUM CHLORIDE 0.9 % (FLUSH) 0.9 %
10 SYRINGE (ML) INJECTION PRN
Status: DISCONTINUED | OUTPATIENT
Start: 2025-03-28 | End: 2025-04-01 | Stop reason: HOSPADM

## 2025-03-28 RX ORDER — FENTANYL CITRATE 50 UG/ML
25 INJECTION, SOLUTION INTRAMUSCULAR; INTRAVENOUS
Status: DISCONTINUED | OUTPATIENT
Start: 2025-03-28 | End: 2025-04-01 | Stop reason: HOSPADM

## 2025-03-28 RX ORDER — IMATINIB MESYLATE 100 MG/1
300 TABLET, FILM COATED ORAL 2 TIMES DAILY
Status: DISCONTINUED | OUTPATIENT
Start: 2025-03-28 | End: 2025-04-01 | Stop reason: HOSPADM

## 2025-03-28 RX ADMIN — SODIUM CHLORIDE: 0.9 INJECTION, SOLUTION INTRAVENOUS at 14:56

## 2025-03-28 RX ADMIN — BUPROPION HYDROCHLORIDE 150 MG: 150 TABLET, EXTENDED RELEASE ORAL at 09:06

## 2025-03-28 RX ADMIN — FENTANYL CITRATE 25 MCG: 50 INJECTION, SOLUTION INTRAMUSCULAR; INTRAVENOUS at 09:16

## 2025-03-28 RX ADMIN — FENTANYL CITRATE 25 MCG: 50 INJECTION, SOLUTION INTRAMUSCULAR; INTRAVENOUS at 20:38

## 2025-03-28 RX ADMIN — IOPAMIDOL 75 ML: 755 INJECTION, SOLUTION INTRAVENOUS at 00:45

## 2025-03-28 RX ADMIN — EMPAGLIFLOZIN 25 MG: 10 TABLET, FILM COATED ORAL at 09:07

## 2025-03-28 RX ADMIN — SODIUM CHLORIDE 1000 ML: 0.9 INJECTION, SOLUTION INTRAVENOUS at 00:22

## 2025-03-28 RX ADMIN — FENTANYL CITRATE 25 MCG: 50 INJECTION, SOLUTION INTRAMUSCULAR; INTRAVENOUS at 22:42

## 2025-03-28 RX ADMIN — FENTANYL CITRATE 50 MCG: 50 INJECTION, SOLUTION INTRAMUSCULAR; INTRAVENOUS at 02:14

## 2025-03-28 RX ADMIN — FENTANYL CITRATE 25 MCG: 50 INJECTION, SOLUTION INTRAMUSCULAR; INTRAVENOUS at 15:45

## 2025-03-28 RX ADMIN — FENTANYL CITRATE 25 MCG: 50 INJECTION, SOLUTION INTRAMUSCULAR; INTRAVENOUS at 06:07

## 2025-03-28 RX ADMIN — FENTANYL CITRATE 25 MCG: 50 INJECTION, SOLUTION INTRAMUSCULAR; INTRAVENOUS at 18:15

## 2025-03-28 RX ADMIN — FENTANYL CITRATE 25 MCG: 50 INJECTION, SOLUTION INTRAMUSCULAR; INTRAVENOUS at 00:23

## 2025-03-28 RX ADMIN — SODIUM CHLORIDE: 0.9 INJECTION, SOLUTION INTRAVENOUS at 03:50

## 2025-03-28 RX ADMIN — FENTANYL CITRATE 25 MCG: 50 INJECTION, SOLUTION INTRAMUSCULAR; INTRAVENOUS at 13:16

## 2025-03-28 RX ADMIN — FLUOXETINE HYDROCHLORIDE 40 MG: 20 CAPSULE ORAL at 09:07

## 2025-03-28 RX ADMIN — ONDANSETRON 4 MG: 2 INJECTION, SOLUTION INTRAMUSCULAR; INTRAVENOUS at 00:24

## 2025-03-28 RX ADMIN — Medication 80 ML: at 00:46

## 2025-03-28 RX ADMIN — SODIUM CHLORIDE, PRESERVATIVE FREE 10 ML: 5 INJECTION INTRAVENOUS at 00:46

## 2025-03-28 ASSESSMENT — PAIN DESCRIPTION - LOCATION
LOCATION: HEAD
LOCATION: ABDOMEN;FLANK
LOCATION: ABDOMEN
LOCATION: ABDOMEN;HEAD
LOCATION: ABDOMEN

## 2025-03-28 ASSESSMENT — PAIN - FUNCTIONAL ASSESSMENT
PAIN_FUNCTIONAL_ASSESSMENT: INTOLERABLE, UNABLE TO DO ANY ACTIVE OR PASSIVE ACTIVITIES
PAIN_FUNCTIONAL_ASSESSMENT: PREVENTS OR INTERFERES WITH MANY ACTIVE NOT PASSIVE ACTIVITIES
PAIN_FUNCTIONAL_ASSESSMENT: 0-10

## 2025-03-28 ASSESSMENT — PAIN SCALES - GENERAL
PAINLEVEL_OUTOF10: 10
PAINLEVEL_OUTOF10: 3
PAINLEVEL_OUTOF10: 3
PAINLEVEL_OUTOF10: 6
PAINLEVEL_OUTOF10: 3
PAINLEVEL_OUTOF10: 5
PAINLEVEL_OUTOF10: 7
PAINLEVEL_OUTOF10: 2
PAINLEVEL_OUTOF10: 3
PAINLEVEL_OUTOF10: 6
PAINLEVEL_OUTOF10: 4
PAINLEVEL_OUTOF10: 5
PAINLEVEL_OUTOF10: 5
PAINLEVEL_OUTOF10: 10

## 2025-03-28 ASSESSMENT — PAIN DESCRIPTION - DESCRIPTORS
DESCRIPTORS: STABBING
DESCRIPTORS: STABBING
DESCRIPTORS: SHARP

## 2025-03-28 ASSESSMENT — PAIN DESCRIPTION - ORIENTATION
ORIENTATION: MID
ORIENTATION: UPPER;MID
ORIENTATION: MID;UPPER
ORIENTATION: MID;UPPER
ORIENTATION: LEFT

## 2025-03-28 ASSESSMENT — PAIN DESCRIPTION - PAIN TYPE: TYPE: ACUTE PAIN

## 2025-03-28 NOTE — CONSULTS
General Surgery   Consultation        PATIENT NAME: Angelica Guzman   YOB: 1950  MRN: [id number]    ADMISSION DATE: 3/27/2025 11:35 PM     Consulting Physician: Dr. Acevedo     Consulted Physician: Dr. Lawrence      TODAY'S DATE: 3/28/2025    Consult regarding: Small bowel obstruction       Cc: Abdominal pain     HPI:  The patient is a 74 y.o. female  who presents with abdominal pain . She has a history of malignant gastrointestinal stromal tumor. She states that she began experiencing increased pain yesterday afternoon. She had some associated nausea, and emesis upon arrival to the Emergency Department. She reports similar symptoms this past December, when she was admitted for a small bowel obstruction. Her SBO was managed non-operatively at that time. She underwent CT imaging in the Emergency Department that demonstrated a partial small bowel obstruction with multiple dilated loops of small bowel with associated adjacent mesenteric edema and interloop fluid with a component of enteritis, as well as hepatic metastatic disease and a large lobulated intra-abdominal mass. Her laboratory work up was significant for a initial lactic acid of 3.4, which has since down trended to 0.6. Upon assessment, patient is resting comfortably. She states she is still having some abdominal pain, but it has improved since admission. No further episodes of emesis. She has not passed any flatus or had bowel movements since admission. Reports last bowel movement was Wednesday, and last passed flatus yesterday.     Past Medical History:        Diagnosis Date    Breast cancer (HCC)     Depression 05/01/2015    HTN (hypertension) 05/01/2015    Hypercholesterolemia 05/01/2015    Malignant GIST (gastrointestinal stromal tumor) ([facility])     Type II or unspecified type diabetes mellitus without mention of complication, not stated as uncontrolled        Past Surgical History:        Procedure Laterality Date    HYSTERECTOMY (CERVIX

## 2025-03-28 NOTE — H&P
[facility]  Office: [phone]  Mo Scales DO, Pedro Pablo Cadet DO, Doc Garrett DO, Noel Lyle DO, Livan Beltran MD, Mayra Haider MD, Beverly Payton MD, Devorah Robert MD,  Perico Hirsch MD, Emmy Garcia MD, Reza Vora MD,  Geovany Martell DO, Liana Grissom MD, Jacob Rivera MD, Alvaro Sclaes DO, Chey Rapp MD,  Yan Morales DO, Nichole Nova MD, Jessica Bergman MD, Cheyenne Quiros MD,  Laurent Houser MD, Rosibel Floyd MD, Randal Shea MD, Matilde Champagne MD, Bryce Carpenter MD, Sophia Haro MD, Adriano Ruiz DO, Chaitanya Ortega MD, Geovany Phillip MD, Mohsin Reza, MD, Shirley Waterhouse, CNP,  Gregoria Milner, CNP, Adriano Servin, CNP,  Ching Ott, DNP, Cathy Hickman, CNP, Alicia Hernandez, CNP, Mirna Ceja, CNP, Katie Koenig, CNP, Mavis Carney PA-C, Chelsea Au, CNP, Dashawn Ireland, CNP,  Bailey Stephen, CNP, Leila Brasher, CNP, Mariam Tomlinson, CNP,  Darling Rodriguez, CNS, Lizzette Ozuna, CNP, Yaz Das, CNP,   Livier Guzman, CNP         [facility]   IN-PATIENT SERVICE   [facility]    HISTORY AND PHYSICAL EXAMINATION            Date:   3/28/2025  Patient name:  Angelica Guzman  Date of admission:  3/27/2025 11:35 PM  MRN:   [id number]  Account:  [id number]  YOB: 1950  PCP:    Catarino Luciano MD  Room:   ER05/ER05  Code Status:    Prior    Chief Complaint:     Chief Complaint   Patient presents with    Abdominal Pain     Pt reports pain in abdomen started this afternoon, worsening. Vomited en route and very diaphoretic, reports dizziness, headache, and numbness in left arm, bilateral legs, left worse than right       History Obtained From:     patient, electronic medical record    History of Present Illness:     Angelica Guzman is a 74 y.o. Unavailable / unknown female who presents with Abdominal Pain (Pt reports pain in abdomen started this afternoon, worsening. Vomited en route and very diaphoretic, reports

## 2025-03-28 NOTE — ED NOTES
ED to inpatient nurses report      Chief Complaint:  Chief Complaint   Patient presents with    Abdominal Pain     Pt reports pain in abdomen started this afternoon, worsening. Vomited en route and very diaphoretic, reports dizziness, headache, and numbness in left arm, bilateral legs, left worse than right     Present to ED from: arrives home w abd pain starting yesterday afternoon and increasingly worse; hx stomach CA w/ mets to liver, pelvis since Dec 2024. Pt had sx of accompanying dizziness, HA and emesis.     MOA:     LOC: alert and orientated to name, place, date  Mobility: Independent  Oxygen Baseline: room air     Current needs required: no oxygen required     Pending ED orders: no pending orders at this time; pt/family asking how long NG will be in as she has had previous bowel obstructions and no NG  Present condition: stable    Why did the patient come to the ED? Abd pain with emesis; hx bowel obstruction. Dx: Partial Small Bowel Obstruction  What is the plan? Admission for bowel obstruction; NG in place to LIS  Initial order for sips water w/ meds prior to NG order  Any procedures or intervention occur? Lab work:  elevated trops 25/29,  CT Abd: partial SBO with mult dilated loops, thickness portions of colon, hepatic met disease, abd mass, small ascites  CXR: WNL, NG to tube place completed.   Any safety concerns??no safety concerns at this time.  CODE STATUS Full Code  Diet Diet NPO Exceptions are: Sips of Water with Meds    Mental Status:  Level of Consciousness: Alert (0)    Psych Assessment:   Psychosocial  Psychosocial (WDL):  (pt resting quietly - watching TV. no acute distress; ambulatory to restroom gait steady and changed pajamas.)  Patient Behaviors: Anxious  Vital signs   Vitals:    03/28/25 0600 03/28/25 0818 03/28/25 0945 03/28/25 1322   BP: (!) 144/64 (!) 145/64 (!) 151/67 (!) 147/66   Pulse:  76 89 84   Resp: 17 15 18 12   Temp: 98 °F (36.7 °C) 98.5 °F (36.9 °C)     TempSrc: Oral Oral

## 2025-03-28 NOTE — ED PROVIDER NOTES
[address]  EMERGENCY DEPARTMENT ENCOUNTER      Pt Name: Angelica Guzman  MRN: [id number]  Birthdate 1950  Date of evaluation: 3/27/2025  Provider: KARYN Nazario CNP  10:31 AM    CHIEF COMPLAINT       Chief Complaint   Patient presents with    Abdominal Pain     Pt reports pain in abdomen started this afternoon, worsening. Vomited en route and very diaphoretic, reports dizziness, headache, and numbness in left arm, bilateral legs, left worse than right         HISTORY OF PRESENT ILLNESS    Angelica Guzman is a 74 y.o. female who presents to the emergency department      This is a nontoxic but genuinely uncomfortable appearing 74-year-old female presenting to the emergency department via private auto, the patient reports that today she started developing severe abdominal pain, she has a history of malignant gastrointestinal stromal tumor, states that she has stomach cancer liver cancer, with mets to the pelvis, in December 2024 the patient had a small bowel obstruction, the patient reports that she tried using one of her oxycodone pain pills without relief of symptoms, her last bowel movement was yesterday, she has had mild nausea, she has no chest pain, is diaphoretic on arrival, and nauseated; was concerned due to the symptoms prompting her to come to the emergency department, she states that she tries to use her pain medication sparingly, that she gets restless legs when she does not take the medication, which is why she took the medication as well as for the abdominal pain prior to arrival.  The patient denies chest pain or shortness of breath but states that she does have radiation of pain down her left arm.  The patient follows with [facility] hematology oncology Dr. Gerardo Robles.  Patient is on oral chemotherapy agents.    The history is provided by the patient and medical records.       Nursing Notes were reviewed.    REVIEW OF SYSTEMS       Review of Systems   Constitutional:  Positive for

## 2025-03-28 NOTE — ED PROVIDER NOTES
I performed a history and physical examination of the patient and discussed management with the mid level provideer. I reviewed the mid level provider's note and agree with the documented findings and plan of care.Any areas of disagreement are noted on the chart. I was personally present for the key portions of any procedures. I have documented in the chart those procedures where I was not present during the key portions. I have reviewed the emergency nurses triage note. I agree with the chief complaint, past medical history, past surgical history, allergies, medications, social and family history as documented unless otherwise noted below. Documentation of the HPI, Physical Exam and Medical Decision Making performed by medical students or scribes is based on my personal performance of the HPI, PE and MDM. For Physician Assistant/ Nurse Practitioner cases/documentation I have personally evaluated this patient and have completed at least one if not all key elements of the E/M (history, physical exam, and MDM). Additional findings are as noted.      Patient is a 74-year-old female with history of GI malignancy with metastases and history of partial small bowel obstruction presenting now with abdominal pain and nausea.  On exam vitals are normal and patient is in no acute distress but does appear to be in a significant amount of pain.  Abdomen soft with diffuse tenderness and palpable mass over the upper abdomen.  Lab work reveals elevated lactic acid and CT scan confirms partial small bowel obstruction similar to the one she had back in December.  General surgery consulted and patient admitted to internal medicine.  Patient had improvement of symptoms with fentanyl and Zofran.  Patient remains hemodynamically normal and stable.    Jorgito Acevedo DO  Emergency Medicine Physician  11:36 PM         Jorgito Acevedo DO  03/28/25 0328

## 2025-03-28 NOTE — PLAN OF CARE
Problem: Chronic Conditions and Co-morbidities  Goal: Patient's chronic conditions and co-morbidity symptoms are monitored and maintained or improved  Outcome: Progressing     Problem: Discharge Planning  Goal: Discharge to home or other facility with appropriate resources  Outcome: Progressing  Flowsheets (Taken 3/28/2025 6337)  Discharge to home or other facility with appropriate resources:   Identify barriers to discharge with patient and caregiver   Arrange for needed discharge resources and transportation as appropriate     Problem: Pain  Goal: Verbalizes/displays adequate comfort level or baseline comfort level  Outcome: Progressing     Problem: Safety - Adult  Goal: Free from fall injury  Outcome: Progressing     Problem: ABCDS Injury Assessment  Goal: Absence of physical injury  Outcome: Progressing

## 2025-03-28 NOTE — ED NOTES
DTR at bedside; asking for pain med for HA and sore throat pain; sl abd pain. NG in place to LIS draining - sm amt green bile liquid.

## 2025-03-29 ENCOUNTER — APPOINTMENT (OUTPATIENT)
Dept: GENERAL RADIOLOGY | Age: 75
DRG: 389 | End: 2025-03-29
Payer: MEDICARE

## 2025-03-29 LAB
ALBUMIN SERPL-MCNC: 3.5 G/DL (ref 3.5–5.2)
ALBUMIN/GLOB SERPL: 1.4 {RATIO} (ref 1–2.5)
ALP SERPL-CCNC: 69 U/L (ref 35–104)
ALT SERPL-CCNC: 6 U/L (ref 5–33)
ANION GAP SERPL CALCULATED.3IONS-SCNC: 15 MMOL/L (ref 9–17)
AST SERPL-CCNC: 11 U/L
BASOPHILS # BLD: 0 K/UL (ref 0–0.2)
BASOPHILS NFR BLD: 0 % (ref 0–2)
BILIRUB SERPL-MCNC: 0.4 MG/DL (ref 0.3–1.2)
BUN SERPL-MCNC: 12 MG/DL (ref 8–23)
CALCIUM SERPL-MCNC: 7.8 MG/DL (ref 8.6–10.4)
CHLORIDE SERPL-SCNC: 109 MMOL/L (ref 98–107)
CO2 SERPL-SCNC: 23 MMOL/L (ref 20–31)
CREAT SERPL-MCNC: 0.6 MG/DL (ref 0.5–0.9)
EOSINOPHIL # BLD: 0.1 K/UL (ref 0–0.4)
EOSINOPHILS RELATIVE PERCENT: 1 % (ref 1–4)
ERYTHROCYTE [DISTWIDTH] IN BLOOD BY AUTOMATED COUNT: 17.1 % (ref 12.5–15.4)
GFR, ESTIMATED: >90 ML/MIN/1.73M2
GLUCOSE SERPL-MCNC: 73 MG/DL (ref 70–99)
HCT VFR BLD AUTO: 29.1 % (ref 36–46)
HGB BLD-MCNC: 9.8 G/DL (ref 12–16)
INR PPP: 1.2 (ref 0.9–1.2)
LYMPHOCYTES NFR BLD: 0.8 K/UL (ref 1–4.8)
LYMPHOCYTES RELATIVE PERCENT: 11 % (ref 24–44)
MAGNESIUM SERPL-MCNC: 2.2 MG/DL (ref 1.6–2.6)
MCH RBC QN AUTO: 30.2 PG (ref 26–34)
MCHC RBC AUTO-ENTMCNC: 33.5 G/DL (ref 31–37)
MCV RBC AUTO: 89.9 FL (ref 80–100)
MONOCYTES NFR BLD: 0.6 K/UL (ref 0.1–1.2)
MONOCYTES NFR BLD: 8 % (ref 2–11)
NEUTROPHILS NFR BLD: 80 % (ref 36–66)
NEUTS SEG NFR BLD: 5.7 K/UL (ref 1.8–7.7)
PLATELET # BLD AUTO: 261 K/UL (ref 140–450)
PMV BLD AUTO: 6 FL (ref 6–12)
POTASSIUM SERPL-SCNC: 3.5 MMOL/L (ref 3.7–5.3)
PROT SERPL-MCNC: 6 G/DL (ref 6.4–8.3)
PROTHROMBIN TIME: 14.6 SEC (ref 11.8–14.6)
RBC # BLD AUTO: 3.24 M/UL (ref 4–5.2)
SODIUM SERPL-SCNC: 147 MMOL/L (ref 135–144)
WBC OTHER # BLD: 7.2 K/UL (ref 3.5–11)

## 2025-03-29 PROCEDURE — 2500000003 HC RX 250 WO HCPCS

## 2025-03-29 PROCEDURE — 85610 PROTHROMBIN TIME: CPT

## 2025-03-29 PROCEDURE — 2580000003 HC RX 258: Performed by: STUDENT IN AN ORGANIZED HEALTH CARE EDUCATION/TRAINING PROGRAM

## 2025-03-29 PROCEDURE — 6370000000 HC RX 637 (ALT 250 FOR IP)

## 2025-03-29 PROCEDURE — 74018 RADEX ABDOMEN 1 VIEW: CPT

## 2025-03-29 PROCEDURE — 99232 SBSQ HOSP IP/OBS MODERATE 35: CPT | Performed by: STUDENT IN AN ORGANIZED HEALTH CARE EDUCATION/TRAINING PROGRAM

## 2025-03-29 PROCEDURE — 85025 COMPLETE CBC W/AUTO DIFF WBC: CPT

## 2025-03-29 PROCEDURE — 36415 COLL VENOUS BLD VENIPUNCTURE: CPT

## 2025-03-29 PROCEDURE — 6360000002 HC RX W HCPCS

## 2025-03-29 PROCEDURE — 80053 COMPREHEN METABOLIC PANEL: CPT

## 2025-03-29 PROCEDURE — 6360000002 HC RX W HCPCS: Performed by: STUDENT IN AN ORGANIZED HEALTH CARE EDUCATION/TRAINING PROGRAM

## 2025-03-29 PROCEDURE — 1200000000 HC SEMI PRIVATE

## 2025-03-29 PROCEDURE — 83735 ASSAY OF MAGNESIUM: CPT

## 2025-03-29 RX ORDER — POTASSIUM CHLORIDE 7.45 MG/ML
10 INJECTION INTRAVENOUS
Status: COMPLETED | OUTPATIENT
Start: 2025-03-29 | End: 2025-03-29

## 2025-03-29 RX ORDER — SODIUM CHLORIDE 9 MG/ML
INJECTION, SOLUTION INTRAVENOUS CONTINUOUS
Status: DISCONTINUED | OUTPATIENT
Start: 2025-03-29 | End: 2025-03-30

## 2025-03-29 RX ADMIN — BUPROPION HYDROCHLORIDE 150 MG: 150 TABLET, EXTENDED RELEASE ORAL at 09:22

## 2025-03-29 RX ADMIN — POTASSIUM CHLORIDE 10 MEQ: 7.46 INJECTION, SOLUTION INTRAVENOUS at 10:26

## 2025-03-29 RX ADMIN — SODIUM CHLORIDE, PRESERVATIVE FREE 10 ML: 5 INJECTION INTRAVENOUS at 03:49

## 2025-03-29 RX ADMIN — FENTANYL CITRATE 25 MCG: 50 INJECTION, SOLUTION INTRAMUSCULAR; INTRAVENOUS at 09:22

## 2025-03-29 RX ADMIN — SODIUM CHLORIDE, PRESERVATIVE FREE 10 ML: 5 INJECTION INTRAVENOUS at 22:04

## 2025-03-29 RX ADMIN — IMATINIB MESYLATE 300 MG: 100 TABLET, FILM COATED ORAL at 12:19

## 2025-03-29 RX ADMIN — FENTANYL CITRATE 25 MCG: 50 INJECTION, SOLUTION INTRAMUSCULAR; INTRAVENOUS at 12:24

## 2025-03-29 RX ADMIN — FENTANYL CITRATE 25 MCG: 50 INJECTION, SOLUTION INTRAMUSCULAR; INTRAVENOUS at 22:04

## 2025-03-29 RX ADMIN — FENTANYL CITRATE 25 MCG: 50 INJECTION, SOLUTION INTRAMUSCULAR; INTRAVENOUS at 03:46

## 2025-03-29 RX ADMIN — SODIUM CHLORIDE, PRESERVATIVE FREE 10 ML: 5 INJECTION INTRAVENOUS at 09:24

## 2025-03-29 RX ADMIN — POTASSIUM CHLORIDE 10 MEQ: 7.46 INJECTION, SOLUTION INTRAVENOUS at 14:49

## 2025-03-29 RX ADMIN — FENTANYL CITRATE 25 MCG: 50 INJECTION, SOLUTION INTRAMUSCULAR; INTRAVENOUS at 19:22

## 2025-03-29 RX ADMIN — FENTANYL CITRATE 25 MCG: 50 INJECTION, SOLUTION INTRAMUSCULAR; INTRAVENOUS at 01:15

## 2025-03-29 RX ADMIN — FLUOXETINE HYDROCHLORIDE 40 MG: 20 CAPSULE ORAL at 09:22

## 2025-03-29 RX ADMIN — POTASSIUM CHLORIDE 10 MEQ: 7.46 INJECTION, SOLUTION INTRAVENOUS at 12:29

## 2025-03-29 RX ADMIN — SODIUM CHLORIDE: 0.9 INJECTION, SOLUTION INTRAVENOUS at 10:24

## 2025-03-29 RX ADMIN — FENTANYL CITRATE 25 MCG: 50 INJECTION, SOLUTION INTRAMUSCULAR; INTRAVENOUS at 16:23

## 2025-03-29 ASSESSMENT — PAIN DESCRIPTION - LOCATION
LOCATION: ABDOMEN

## 2025-03-29 ASSESSMENT — PAIN SCALES - GENERAL
PAINLEVEL_OUTOF10: 7
PAINLEVEL_OUTOF10: 5
PAINLEVEL_OUTOF10: 6
PAINLEVEL_OUTOF10: 5
PAINLEVEL_OUTOF10: 6
PAINLEVEL_OUTOF10: 3
PAINLEVEL_OUTOF10: 4
PAINLEVEL_OUTOF10: 6
PAINLEVEL_OUTOF10: 7
PAINLEVEL_OUTOF10: 7

## 2025-03-29 ASSESSMENT — PAIN - FUNCTIONAL ASSESSMENT
PAIN_FUNCTIONAL_ASSESSMENT: PREVENTS OR INTERFERES SOME ACTIVE ACTIVITIES AND ADLS
PAIN_FUNCTIONAL_ASSESSMENT: PREVENTS OR INTERFERES SOME ACTIVE ACTIVITIES AND ADLS

## 2025-03-29 ASSESSMENT — PAIN DESCRIPTION - ORIENTATION
ORIENTATION: MID
ORIENTATION: MID

## 2025-03-29 ASSESSMENT — PAIN DESCRIPTION - DESCRIPTORS
DESCRIPTORS: ACHING
DESCRIPTORS: ACHING;SHARP

## 2025-03-29 NOTE — PLAN OF CARE
Problem: Chronic Conditions and Co-morbidities  Goal: Patient's chronic conditions and co-morbidity symptoms are monitored and maintained or improved  Outcome: Progressing  Flowsheets (Taken 3/29/2025 0800)  Care Plan - Patient's Chronic Conditions and Co-Morbidity Symptoms are Monitored and Maintained or Improved: Monitor and assess patient's chronic conditions and comorbid symptoms for stability, deterioration, or improvement     Problem: Discharge Planning  Goal: Discharge to home or other facility with appropriate resources  Outcome: Progressing  Flowsheets (Taken 3/29/2025 0800)  Discharge to home or other facility with appropriate resources: Identify barriers to discharge with patient and caregiver     Problem: Pain  Goal: Verbalizes/displays adequate comfort level or baseline comfort level  Outcome: Progressing     Problem: Safety - Adult  Goal: Free from fall injury  Outcome: Progressing     Problem: ABCDS Injury Assessment  Goal: Absence of physical injury  Outcome: Progressing

## 2025-03-29 NOTE — CARE COORDINATION
Case Management Assessment  Initial Evaluation    Date/Time of Evaluation: 3/29/2025 3:38 PM  Assessment Completed by: Tressa Richardson RN    If patient is discharged prior to next notation, then this note serves as note for discharge by case management.    Patient Name: Angelica Guzman                   YOB: 1950  Diagnosis: Partial small bowel obstruction (HCC) [K56.600]                   Date / Time: 3/27/2025 11:35 PM    Patient Admission Status: Inpatient   Readmission Risk (Low < 19, Mod (19-27), High > 27): Readmission Risk Score: 11.7    Current PCP: Catarino Luciano MD  PCP verified by CM? Yes    Chart Reviewed: Yes      History Provided by: Patient  Patient Orientation: Alert and Oriented    Patient Cognition: Alert    Hospitalization in the last 30 days (Readmission):  No    If yes, Readmission Assessment in  Navigator will be completed.    Advance Directives:      Code Status: Full Code   Patient's Primary Decision Maker is: Legal Next of Kin      Discharge Planning:    Patient lives with: Children, Family Members (son and grandaughter) Type of Home: House  Primary Care Giver: Self  Patient Support Systems include: Family Members   Current Financial resources: Medicare  Current community resources: None  Current services prior to admission: C-pap            Current DME:              Type of Home Care services:  None    ADLS  Prior functional level: Independent in ADLs/IADLs  Current functional level: Independent in ADLs/IADLs    PT AM-PAC:   /24  OT AM-PAC:   /24    Family can provide assistance at DC: Yes  Would you like Case Management to discuss the discharge plan with any other family members/significant others, and if so, who? No  Plans to Return to Present Housing: Yes  Other Identified Issues/Barriers to RETURNING to current housing: clinical status  Potential Assistance needed at discharge: N/A            Potential DME:    Patient expects to discharge to: House  Plan for

## 2025-03-29 NOTE — PLAN OF CARE
Problem: Chronic Conditions and Co-morbidities  Goal: Patient's chronic conditions and co-morbidity symptoms are monitored and maintained or improved  3/28/2025 2300 by Maryanne Lopez LPN  Outcome: Progressing     Problem: Discharge Planning  Goal: Discharge to home or other facility with appropriate resources  3/28/2025 2300 by Maryanne Lopez LPN  Outcome: Progressing     Problem: Pain  Goal: Verbalizes/displays adequate comfort level or baseline comfort level  3/28/2025 2300 by Maryanne Lopez LPN  Outcome: Progressing     Problem: Safety - Adult  Goal: Free from fall injury  3/28/2025 2300 by Maryanne Lopez LPN  Outcome: Progressing     Problem: ABCDS Injury Assessment  Goal: Absence of physical injury  3/28/2025 2300 by Maryanne Lopez LPN  Outcome: Progressing

## 2025-03-30 ENCOUNTER — APPOINTMENT (OUTPATIENT)
Dept: GENERAL RADIOLOGY | Age: 75
DRG: 389 | End: 2025-03-30
Payer: MEDICARE

## 2025-03-30 LAB
ANION GAP SERPL CALCULATED.3IONS-SCNC: 20 MMOL/L (ref 9–17)
BASOPHILS # BLD: 0.02 K/UL (ref 0–0.2)
BASOPHILS NFR BLD: 0 % (ref 0–2)
BUN SERPL-MCNC: 10 MG/DL (ref 8–23)
CALCIUM SERPL-MCNC: 7.8 MG/DL (ref 8.6–10.4)
CHLORIDE SERPL-SCNC: 106 MMOL/L (ref 98–107)
CO2 SERPL-SCNC: 19 MMOL/L (ref 20–31)
CREAT SERPL-MCNC: 0.5 MG/DL (ref 0.5–0.9)
EOSINOPHIL # BLD: 0.14 K/UL (ref 0–0.4)
EOSINOPHILS RELATIVE PERCENT: 2 % (ref 1–4)
ERYTHROCYTE [DISTWIDTH] IN BLOOD BY AUTOMATED COUNT: 17 % (ref 12.5–15.4)
GFR, ESTIMATED: >90 ML/MIN/1.73M2
GLUCOSE BLD-MCNC: 102 MG/DL (ref 65–105)
GLUCOSE BLD-MCNC: 138 MG/DL (ref 65–105)
GLUCOSE BLD-MCNC: 64 MG/DL (ref 65–105)
GLUCOSE BLD-MCNC: 87 MG/DL (ref 65–105)
GLUCOSE SERPL-MCNC: 61 MG/DL (ref 70–99)
HCT VFR BLD AUTO: 32.7 % (ref 36–46)
HGB BLD-MCNC: 10.1 G/DL (ref 12–16)
INR PPP: 1.3 (ref 0.9–1.2)
LYMPHOCYTES NFR BLD: 0.91 K/UL (ref 1–4.8)
LYMPHOCYTES RELATIVE PERCENT: 10 % (ref 24–44)
MCH RBC QN AUTO: 29.2 PG (ref 26–34)
MCHC RBC AUTO-ENTMCNC: 30.9 G/DL (ref 31–37)
MCV RBC AUTO: 94.5 FL (ref 80–100)
MONOCYTES NFR BLD: 0.69 K/UL (ref 0.1–1.2)
MONOCYTES NFR BLD: 7 % (ref 2–11)
NEUTROPHILS NFR BLD: 81 % (ref 36–66)
NEUTS SEG NFR BLD: 7.64 K/UL (ref 1.8–7.7)
PARTIAL THROMBOPLASTIN TIME: 29.2 SEC (ref 24–36)
PLATELET # BLD AUTO: 233 K/UL (ref 140–450)
PMV BLD AUTO: 8.6 FL (ref 8–14)
POTASSIUM SERPL-SCNC: 3.7 MMOL/L (ref 3.7–5.3)
PROTHROMBIN TIME: 15.5 SEC (ref 11.8–14.6)
RBC # BLD AUTO: 3.46 M/UL (ref 4–5.2)
SODIUM SERPL-SCNC: 145 MMOL/L (ref 135–144)
WBC OTHER # BLD: 9.4 K/UL (ref 3.5–11)

## 2025-03-30 PROCEDURE — 1200000000 HC SEMI PRIVATE

## 2025-03-30 PROCEDURE — 2580000003 HC RX 258: Performed by: STUDENT IN AN ORGANIZED HEALTH CARE EDUCATION/TRAINING PROGRAM

## 2025-03-30 PROCEDURE — 6370000000 HC RX 637 (ALT 250 FOR IP)

## 2025-03-30 PROCEDURE — 80048 BASIC METABOLIC PNL TOTAL CA: CPT

## 2025-03-30 PROCEDURE — 85025 COMPLETE CBC W/AUTO DIFF WBC: CPT

## 2025-03-30 PROCEDURE — 6360000002 HC RX W HCPCS

## 2025-03-30 PROCEDURE — 36415 COLL VENOUS BLD VENIPUNCTURE: CPT

## 2025-03-30 PROCEDURE — 85610 PROTHROMBIN TIME: CPT

## 2025-03-30 PROCEDURE — 6370000000 HC RX 637 (ALT 250 FOR IP): Performed by: STUDENT IN AN ORGANIZED HEALTH CARE EDUCATION/TRAINING PROGRAM

## 2025-03-30 PROCEDURE — 85730 THROMBOPLASTIN TIME PARTIAL: CPT

## 2025-03-30 PROCEDURE — 82947 ASSAY GLUCOSE BLOOD QUANT: CPT

## 2025-03-30 PROCEDURE — 99232 SBSQ HOSP IP/OBS MODERATE 35: CPT | Performed by: STUDENT IN AN ORGANIZED HEALTH CARE EDUCATION/TRAINING PROGRAM

## 2025-03-30 PROCEDURE — 2500000003 HC RX 250 WO HCPCS

## 2025-03-30 PROCEDURE — 6360000002 HC RX W HCPCS: Performed by: STUDENT IN AN ORGANIZED HEALTH CARE EDUCATION/TRAINING PROGRAM

## 2025-03-30 PROCEDURE — 74018 RADEX ABDOMEN 1 VIEW: CPT

## 2025-03-30 PROCEDURE — 2500000003 HC RX 250 WO HCPCS: Performed by: STUDENT IN AN ORGANIZED HEALTH CARE EDUCATION/TRAINING PROGRAM

## 2025-03-30 RX ORDER — DEXTROSE MONOHYDRATE 100 MG/ML
INJECTION, SOLUTION INTRAVENOUS CONTINUOUS PRN
Status: DISCONTINUED | OUTPATIENT
Start: 2025-03-30 | End: 2025-04-01 | Stop reason: HOSPADM

## 2025-03-30 RX ORDER — DEXTROSE MONOHYDRATE, SODIUM CHLORIDE, AND POTASSIUM CHLORIDE 50; 1.49; 4.5 G/1000ML; G/1000ML; G/1000ML
INJECTION, SOLUTION INTRAVENOUS CONTINUOUS
Status: DISCONTINUED | OUTPATIENT
Start: 2025-03-30 | End: 2025-03-31

## 2025-03-30 RX ORDER — SODIUM CHLORIDE, SODIUM LACTATE, POTASSIUM CHLORIDE, CALCIUM CHLORIDE 600; 310; 30; 20 MG/100ML; MG/100ML; MG/100ML; MG/100ML
INJECTION, SOLUTION INTRAVENOUS CONTINUOUS
Status: DISCONTINUED | OUTPATIENT
Start: 2025-03-30 | End: 2025-03-30

## 2025-03-30 RX ORDER — GLUCAGON 1 MG/ML
1 KIT INJECTION PRN
Status: DISCONTINUED | OUTPATIENT
Start: 2025-03-30 | End: 2025-04-01 | Stop reason: HOSPADM

## 2025-03-30 RX ADMIN — FLUOXETINE HYDROCHLORIDE 40 MG: 20 CAPSULE ORAL at 10:13

## 2025-03-30 RX ADMIN — FENTANYL CITRATE 25 MCG: 50 INJECTION, SOLUTION INTRAMUSCULAR; INTRAVENOUS at 03:26

## 2025-03-30 RX ADMIN — PANTOPRAZOLE SODIUM 40 MG: 40 INJECTION, POWDER, FOR SOLUTION INTRAVENOUS at 14:20

## 2025-03-30 RX ADMIN — SODIUM CHLORIDE, SODIUM LACTATE, POTASSIUM CHLORIDE, AND CALCIUM CHLORIDE: .6; .31; .03; .02 INJECTION, SOLUTION INTRAVENOUS at 08:09

## 2025-03-30 RX ADMIN — IMATINIB MESYLATE 300 MG: 100 TABLET, FILM COATED ORAL at 10:13

## 2025-03-30 RX ADMIN — ACETAMINOPHEN 650 MG: 325 TABLET ORAL at 20:12

## 2025-03-30 RX ADMIN — SODIUM CHLORIDE: 0.9 INJECTION, SOLUTION INTRAVENOUS at 00:13

## 2025-03-30 RX ADMIN — DEXTROSE, SODIUM CHLORIDE, AND POTASSIUM CHLORIDE: 5; .45; .15 INJECTION INTRAVENOUS at 14:20

## 2025-03-30 RX ADMIN — SODIUM CHLORIDE, PRESERVATIVE FREE 10 ML: 5 INJECTION INTRAVENOUS at 00:24

## 2025-03-30 RX ADMIN — SODIUM CHLORIDE, PRESERVATIVE FREE 10 ML: 5 INJECTION INTRAVENOUS at 10:16

## 2025-03-30 RX ADMIN — SODIUM CHLORIDE, PRESERVATIVE FREE 10 ML: 5 INJECTION INTRAVENOUS at 20:12

## 2025-03-30 RX ADMIN — FENTANYL CITRATE 25 MCG: 50 INJECTION, SOLUTION INTRAMUSCULAR; INTRAVENOUS at 13:15

## 2025-03-30 RX ADMIN — PHENOL 1 SPRAY: 1.5 LIQUID ORAL at 11:39

## 2025-03-30 RX ADMIN — DEXTROSE MONOHYDRATE 125 ML: 100 INJECTION, SOLUTION INTRAVENOUS at 14:31

## 2025-03-30 RX ADMIN — BUPROPION HYDROCHLORIDE 150 MG: 150 TABLET, EXTENDED RELEASE ORAL at 10:13

## 2025-03-30 RX ADMIN — FENTANYL CITRATE 25 MCG: 50 INJECTION, SOLUTION INTRAMUSCULAR; INTRAVENOUS at 06:15

## 2025-03-30 RX ADMIN — IMATINIB MESYLATE 300 MG: 100 TABLET, FILM COATED ORAL at 22:38

## 2025-03-30 RX ADMIN — FENTANYL CITRATE 25 MCG: 50 INJECTION, SOLUTION INTRAMUSCULAR; INTRAVENOUS at 00:24

## 2025-03-30 ASSESSMENT — PAIN DESCRIPTION - DESCRIPTORS
DESCRIPTORS: ACHING
DESCRIPTORS: SHARP;ACHING
DESCRIPTORS: SHARP;ACHING

## 2025-03-30 ASSESSMENT — PAIN SCALES - GENERAL
PAINLEVEL_OUTOF10: 5
PAINLEVEL_OUTOF10: 6
PAINLEVEL_OUTOF10: 3
PAINLEVEL_OUTOF10: 5
PAINLEVEL_OUTOF10: 8

## 2025-03-30 ASSESSMENT — PAIN DESCRIPTION - LOCATION
LOCATION: ABDOMEN;NECK
LOCATION: THROAT
LOCATION: ABDOMEN

## 2025-03-30 NOTE — PLAN OF CARE
Problem: Chronic Conditions and Co-morbidities  Goal: Patient's chronic conditions and co-morbidity symptoms are monitored and maintained or improved  3/30/2025 0210 by Maryanne Lopez LPN  Outcome: Progressing     Problem: Discharge Planning  Goal: Discharge to home or other facility with appropriate resources  3/30/2025 0210 by Maryanne Lopez LPN  Outcome: Progressing     Problem: Pain  Goal: Verbalizes/displays adequate comfort level or baseline comfort level  3/30/2025 0210 by Maryanne Lopez LPN  Outcome: Progressing     Problem: Safety - Adult  Goal: Free from fall injury  3/30/2025 0210 by Maryanne Lopez LPN  Outcome: Progressing     Problem: ABCDS Injury Assessment  Goal: Absence of physical injury  3/30/2025 0210 by Maryanne Lopez LPN  Outcome: Progressing

## 2025-03-30 NOTE — PLAN OF CARE
Problem: Chronic Conditions and Co-morbidities  Goal: Patient's chronic conditions and co-morbidity symptoms are monitored and maintained or improved  3/30/2025 1257 by Duane Pillai RN  Outcome: Progressing  Flowsheets (Taken 3/30/2025 0800)  Care Plan - Patient's Chronic Conditions and Co-Morbidity Symptoms are Monitored and Maintained or Improved: Monitor and assess patient's chronic conditions and comorbid symptoms for stability, deterioration, or improvement  3/30/2025 0210 by Maryanne Lopez LPN  Outcome: Progressing     Problem: Discharge Planning  Goal: Discharge to home or other facility with appropriate resources  3/30/2025 1257 by Duane Pillai RN  Outcome: Progressing  Flowsheets (Taken 3/30/2025 0800)  Discharge to home or other facility with appropriate resources: Identify barriers to discharge with patient and caregiver  3/30/2025 0210 by Maryanne Lopez LPN  Outcome: Progressing     Problem: Pain  Goal: Verbalizes/displays adequate comfort level or baseline comfort level  3/30/2025 1257 by Duane Pillai RN  Outcome: Progressing  3/30/2025 0210 by Maryanne Lopez LPN  Outcome: Progressing     Problem: Safety - Adult  Goal: Free from fall injury  3/30/2025 1257 by Duane Pillai RN  Outcome: Progressing  3/30/2025 0210 by Maryanne Lopez LPN  Outcome: Progressing     Problem: ABCDS Injury Assessment  Goal: Absence of physical injury  3/30/2025 1257 by Duane Pillai RN  Outcome: Progressing  3/30/2025 0210 by Maryanne Lopez LPN  Outcome: Progressing

## 2025-03-31 LAB
ANION GAP SERPL CALCULATED.3IONS-SCNC: 10 MMOL/L (ref 9–17)
BASOPHILS # BLD: 0 K/UL (ref 0–0.2)
BASOPHILS NFR BLD: 0 % (ref 0–2)
BUN SERPL-MCNC: 7 MG/DL (ref 8–23)
CALCIUM SERPL-MCNC: 7.3 MG/DL (ref 8.6–10.4)
CHLORIDE SERPL-SCNC: 106 MMOL/L (ref 98–107)
CO2 SERPL-SCNC: 24 MMOL/L (ref 20–31)
CREAT SERPL-MCNC: 0.5 MG/DL (ref 0.5–0.9)
EOSINOPHIL # BLD: 0.3 K/UL (ref 0–0.4)
EOSINOPHILS RELATIVE PERCENT: 4 % (ref 1–4)
ERYTHROCYTE [DISTWIDTH] IN BLOOD BY AUTOMATED COUNT: 17 % (ref 12.5–15.4)
GFR, ESTIMATED: >90 ML/MIN/1.73M2
GLUCOSE BLD-MCNC: 103 MG/DL (ref 65–105)
GLUCOSE BLD-MCNC: 135 MG/DL (ref 65–105)
GLUCOSE BLD-MCNC: 146 MG/DL (ref 65–105)
GLUCOSE BLD-MCNC: 80 MG/DL (ref 65–105)
GLUCOSE SERPL-MCNC: 98 MG/DL (ref 70–99)
HCT VFR BLD AUTO: 27.3 % (ref 36–46)
HGB BLD-MCNC: 9.1 G/DL (ref 12–16)
LYMPHOCYTES NFR BLD: 0.9 K/UL (ref 1–4.8)
LYMPHOCYTES RELATIVE PERCENT: 14 % (ref 24–44)
MAGNESIUM SERPL-MCNC: 1.9 MG/DL (ref 1.6–2.6)
MCH RBC QN AUTO: 29.6 PG (ref 26–34)
MCHC RBC AUTO-ENTMCNC: 33.4 G/DL (ref 31–37)
MCV RBC AUTO: 88.7 FL (ref 80–100)
MONOCYTES NFR BLD: 0.6 K/UL (ref 0.1–1.2)
MONOCYTES NFR BLD: 10 % (ref 2–11)
NEUTROPHILS NFR BLD: 72 % (ref 36–66)
NEUTS SEG NFR BLD: 4.6 K/UL (ref 1.8–7.7)
PLATELET # BLD AUTO: 211 K/UL (ref 140–450)
PMV BLD AUTO: 6 FL (ref 6–12)
POTASSIUM SERPL-SCNC: 3.1 MMOL/L (ref 3.7–5.3)
RBC # BLD AUTO: 3.07 M/UL (ref 4–5.2)
SODIUM SERPL-SCNC: 140 MMOL/L (ref 135–144)
WBC OTHER # BLD: 6.4 K/UL (ref 3.5–11)

## 2025-03-31 PROCEDURE — 2500000003 HC RX 250 WO HCPCS

## 2025-03-31 PROCEDURE — 6360000002 HC RX W HCPCS: Performed by: STUDENT IN AN ORGANIZED HEALTH CARE EDUCATION/TRAINING PROGRAM

## 2025-03-31 PROCEDURE — 85025 COMPLETE CBC W/AUTO DIFF WBC: CPT

## 2025-03-31 PROCEDURE — 2580000003 HC RX 258: Performed by: STUDENT IN AN ORGANIZED HEALTH CARE EDUCATION/TRAINING PROGRAM

## 2025-03-31 PROCEDURE — 6360000002 HC RX W HCPCS

## 2025-03-31 PROCEDURE — 82947 ASSAY GLUCOSE BLOOD QUANT: CPT

## 2025-03-31 PROCEDURE — 99232 SBSQ HOSP IP/OBS MODERATE 35: CPT | Performed by: STUDENT IN AN ORGANIZED HEALTH CARE EDUCATION/TRAINING PROGRAM

## 2025-03-31 PROCEDURE — 2500000003 HC RX 250 WO HCPCS: Performed by: STUDENT IN AN ORGANIZED HEALTH CARE EDUCATION/TRAINING PROGRAM

## 2025-03-31 PROCEDURE — 1200000000 HC SEMI PRIVATE

## 2025-03-31 PROCEDURE — 80048 BASIC METABOLIC PNL TOTAL CA: CPT

## 2025-03-31 PROCEDURE — 36415 COLL VENOUS BLD VENIPUNCTURE: CPT

## 2025-03-31 PROCEDURE — 6370000000 HC RX 637 (ALT 250 FOR IP)

## 2025-03-31 PROCEDURE — 83735 ASSAY OF MAGNESIUM: CPT

## 2025-03-31 RX ADMIN — ACETAMINOPHEN 650 MG: 325 TABLET ORAL at 08:37

## 2025-03-31 RX ADMIN — PANTOPRAZOLE SODIUM 40 MG: 40 INJECTION, POWDER, FOR SOLUTION INTRAVENOUS at 08:36

## 2025-03-31 RX ADMIN — SODIUM CHLORIDE, PRESERVATIVE FREE 10 ML: 5 INJECTION INTRAVENOUS at 20:36

## 2025-03-31 RX ADMIN — BUPROPION HYDROCHLORIDE 150 MG: 150 TABLET, EXTENDED RELEASE ORAL at 08:36

## 2025-03-31 RX ADMIN — IMATINIB MESYLATE 300 MG: 100 TABLET, FILM COATED ORAL at 20:35

## 2025-03-31 RX ADMIN — FLUOXETINE HYDROCHLORIDE 40 MG: 20 CAPSULE ORAL at 08:37

## 2025-03-31 RX ADMIN — FENTANYL CITRATE 25 MCG: 50 INJECTION, SOLUTION INTRAMUSCULAR; INTRAVENOUS at 00:57

## 2025-03-31 RX ADMIN — DEXTROSE, SODIUM CHLORIDE, AND POTASSIUM CHLORIDE: 5; .45; .15 INJECTION INTRAVENOUS at 04:31

## 2025-03-31 RX ADMIN — IMATINIB MESYLATE 300 MG: 100 TABLET, FILM COATED ORAL at 10:00

## 2025-03-31 RX ADMIN — FENTANYL CITRATE 25 MCG: 50 INJECTION, SOLUTION INTRAMUSCULAR; INTRAVENOUS at 21:22

## 2025-03-31 RX ADMIN — FENTANYL CITRATE 25 MCG: 50 INJECTION, SOLUTION INTRAMUSCULAR; INTRAVENOUS at 15:59

## 2025-03-31 RX ADMIN — ONDANSETRON 4 MG: 2 INJECTION, SOLUTION INTRAMUSCULAR; INTRAVENOUS at 21:23

## 2025-03-31 RX ADMIN — FENTANYL CITRATE 25 MCG: 50 INJECTION, SOLUTION INTRAMUSCULAR; INTRAVENOUS at 04:35

## 2025-03-31 RX ADMIN — SODIUM CHLORIDE, PRESERVATIVE FREE 10 ML: 5 INJECTION INTRAVENOUS at 08:35

## 2025-03-31 ASSESSMENT — PAIN DESCRIPTION - LOCATION
LOCATION: LEG
LOCATION: HEAD
LOCATION: LEG
LOCATION: HEAD
LOCATION: HEAD
LOCATION: ABDOMEN
LOCATION: BACK
LOCATION: HEAD

## 2025-03-31 ASSESSMENT — PAIN SCALES - GENERAL
PAINLEVEL_OUTOF10: 4
PAINLEVEL_OUTOF10: 6
PAINLEVEL_OUTOF10: 4
PAINLEVEL_OUTOF10: 4
PAINLEVEL_OUTOF10: 5
PAINLEVEL_OUTOF10: 0
PAINLEVEL_OUTOF10: 4
PAINLEVEL_OUTOF10: 5
PAINLEVEL_OUTOF10: 2

## 2025-03-31 ASSESSMENT — PAIN DESCRIPTION - ONSET
ONSET: GRADUAL
ONSET: GRADUAL

## 2025-03-31 ASSESSMENT — PAIN DESCRIPTION - DESCRIPTORS
DESCRIPTORS: ACHING
DESCRIPTORS: DISCOMFORT
DESCRIPTORS: ACHING

## 2025-03-31 ASSESSMENT — PAIN DESCRIPTION - PAIN TYPE
TYPE: ACUTE PAIN
TYPE: ACUTE PAIN

## 2025-03-31 ASSESSMENT — PAIN DESCRIPTION - FREQUENCY
FREQUENCY: INTERMITTENT
FREQUENCY: CONTINUOUS

## 2025-03-31 ASSESSMENT — PAIN - FUNCTIONAL ASSESSMENT
PAIN_FUNCTIONAL_ASSESSMENT: ACTIVITIES ARE NOT PREVENTED

## 2025-03-31 ASSESSMENT — PAIN DESCRIPTION - ORIENTATION
ORIENTATION: LEFT
ORIENTATION: LEFT
ORIENTATION: MID
ORIENTATION: RIGHT;LEFT

## 2025-03-31 NOTE — PLAN OF CARE
Problem: Chronic Conditions and Co-morbidities  Goal: Patient's chronic conditions and co-morbidity symptoms are monitored and maintained or improved  Outcome: Progressing  Flowsheets (Taken 3/31/2025 1056)  Care Plan - Patient's Chronic Conditions and Co-Morbidity Symptoms are Monitored and Maintained or Improved: Monitor and assess patient's chronic conditions and comorbid symptoms for stability, deterioration, or improvement     Problem: Discharge Planning  Goal: Discharge to home or other facility with appropriate resources  Outcome: Progressing  Flowsheets (Taken 3/31/2025 1051)  Discharge to home or other facility with appropriate resources:   Identify barriers to discharge with patient and caregiver   Arrange for needed discharge resources and transportation as appropriate     Problem: Pain  Goal: Verbalizes/displays adequate comfort level or baseline comfort level  Outcome: Progressing     Problem: Safety - Adult  Goal: Free from fall injury  Outcome: Progressing     Problem: ABCDS Injury Assessment  Goal: Absence of physical injury  Outcome: Progressing

## 2025-03-31 NOTE — PLAN OF CARE
Problem: Chronic Conditions and Co-morbidities  Goal: Patient's chronic conditions and co-morbidity symptoms are monitored and maintained or improved  3/31/2025 0156 by Mavis Taylor RN  Outcome: Progressing  Flowsheets (Taken 3/30/2025 2000)  Care Plan - Patient's Chronic Conditions and Co-Morbidity Symptoms are Monitored and Maintained or Improved: Monitor and assess patient's chronic conditions and comorbid symptoms for stability, deterioration, or improvement     Problem: Discharge Planning  Goal: Discharge to home or other facility with appropriate resources  3/31/2025 0156 by Mavis Taylor, RN  Outcome: Progressing  Flowsheets (Taken 3/30/2025 2000)  Discharge to home or other facility with appropriate resources: Identify barriers to discharge with patient and caregiver     Problem: Pain  Goal: Verbalizes/displays adequate comfort level or baseline comfort level  3/31/2025 0156 by Mavis Taylor, RN  Outcome: Progressing     Problem: Safety - Adult  Goal: Free from fall injury  3/31/2025 0156 by Mavis Taylor, RN  Outcome: Progressing     Problem: ABCDS Injury Assessment  Goal: Absence of physical injury  3/31/2025 0156 by Mavis Taylor, RN  Outcome: Progressing

## 2025-03-31 NOTE — PLAN OF CARE
[facility]  Office: [phone]  Mo Scales DO, Pedro Pablo Cadet DO, Doc Garrett DO, Noel Lyle DO, Livan Beltran MD, Marya Haider MD, Beverly Payton MD, Devorah Robert MD,  Perico Hirsch MD, Emmy Garcia MD, Reza Vora MD,  Geovany Martell DO, Liana Grissom MD, Jacob Rivera MD, Alvaro Scales DO, Chey Rapp MD,  Yan Morales DO, Nichole Nova MD, Jessica Bergman MD, Cheyenne Quiros MD,  Laurent Houser MD, Rosibel Floyd MD, Randal Shea MD, Matilde Champagne MD, Bryce Carpenter MD, Sophia Haro MD, Adriano Ruiz DO, Chaitanya Ortega MD, Geovany Phillip MD, Mohsin Reza, MD, Curtis Peñaloza MD, Shirley Waterhouse, CNP,  Gregoria Milner, CNP, Adriano Servin, CNP,  Ching Ott, DNP, Cathy Hickman, CNP, Alicia Hernandez, CNP, Mirna Ceja, CNP, Katie Koenig, CNP, Mavis Carney, PA-C, Chelsea Au, CNP, Dashawn Ireland, CNP,  Bailey Stephen, CNP, Leila Brasher, CNP, Todd Chavarria, PA-C, Mariam Tomlinson, CNP,  Darling Rodriguez, CNS, Lizzette Ozuna, CNP, Yaz Das, CNP,   Livier Guzman, CNP         [facility]   IN-PATIENT SERVICE   [facility]    Second Visit Note  For more detailed information please refer to the progress note of the day      3/31/2025    6:30 PM    Name:   Angelica Guzman  MRN:     [id number]     Acct:      [id number]   Room:   304/304-01  IP Day:  3  Admit Date:  3/27/2025 11:35 PM    PCP:   Catarino Luciano MD  Code Status:  Full Code        Pt vitals were reviewed   New labs were reviewed   Patient was seen    Updated plan :     Tried a few bites of turkey out of turkey sandwich, and 3 bites of sweet potatoes still having some abdominal pain afterwards. Monitor overnight, if tolerating can likely be discharged tomorrow.         Reza Vora MD  3/31/2025  6:30 PM

## 2025-03-31 NOTE — CARE COORDINATION
[facility] Quality Flow/Interdisciplinary Rounds Progress Note    Quality Flow Rounds held on March 31, 2025 at 0930    Disciplines Attending:  Bedside Nurse, , , and Nursing Unit Leadership    Barriers to Discharge:  NG plan to advance to full liquid today     Anticipated Discharge Date:   TBD    Anticipated Discharge Disposition:  Goal is home with family     [facility] RISK OF UNPLANNED READMISSION 2.0             12.4 Total Score        Discussed patient goal for the day, patient clinical progression, and barriers to discharge.        Rosa Paez RN  March 31, 2025

## 2025-04-01 VITALS
TEMPERATURE: 98.4 F | OXYGEN SATURATION: 95 % | WEIGHT: 184.08 LBS | HEIGHT: 65 IN | HEART RATE: 77 BPM | RESPIRATION RATE: 16 BRPM | BODY MASS INDEX: 30.67 KG/M2 | SYSTOLIC BLOOD PRESSURE: 137 MMHG | DIASTOLIC BLOOD PRESSURE: 63 MMHG

## 2025-04-01 LAB
ANION GAP SERPL CALCULATED.3IONS-SCNC: 8 MMOL/L (ref 9–17)
BASOPHILS # BLD: 0 K/UL (ref 0–0.2)
BASOPHILS NFR BLD: 1 % (ref 0–2)
BUN SERPL-MCNC: 6 MG/DL (ref 8–23)
CALCIUM SERPL-MCNC: 7.3 MG/DL (ref 8.6–10.4)
CHLORIDE SERPL-SCNC: 103 MMOL/L (ref 98–107)
CO2 SERPL-SCNC: 27 MMOL/L (ref 20–31)
CREAT SERPL-MCNC: 0.5 MG/DL (ref 0.5–0.9)
EOSINOPHIL # BLD: 0.2 K/UL (ref 0–0.4)
EOSINOPHILS RELATIVE PERCENT: 3 % (ref 1–4)
ERYTHROCYTE [DISTWIDTH] IN BLOOD BY AUTOMATED COUNT: 17.4 % (ref 12.5–15.4)
GFR, ESTIMATED: >90 ML/MIN/1.73M2
GLUCOSE BLD-MCNC: 119 MG/DL (ref 65–105)
GLUCOSE BLD-MCNC: 128 MG/DL (ref 65–105)
GLUCOSE SERPL-MCNC: 143 MG/DL (ref 70–99)
HCT VFR BLD AUTO: 28 % (ref 36–46)
HGB BLD-MCNC: 9.2 G/DL (ref 12–16)
LYMPHOCYTES NFR BLD: 0.7 K/UL (ref 1–4.8)
LYMPHOCYTES RELATIVE PERCENT: 13 % (ref 24–44)
MAGNESIUM SERPL-MCNC: 1.7 MG/DL (ref 1.6–2.6)
MCH RBC QN AUTO: 29.4 PG (ref 26–34)
MCHC RBC AUTO-ENTMCNC: 33 G/DL (ref 31–37)
MCV RBC AUTO: 89.3 FL (ref 80–100)
MONOCYTES NFR BLD: 0.5 K/UL (ref 0.1–1.2)
MONOCYTES NFR BLD: 10 % (ref 2–11)
NEUTROPHILS NFR BLD: 73 % (ref 36–66)
NEUTS SEG NFR BLD: 3.9 K/UL (ref 1.8–7.7)
PLATELET # BLD AUTO: 219 K/UL (ref 140–450)
PMV BLD AUTO: 6.5 FL (ref 6–12)
POTASSIUM SERPL-SCNC: 3.2 MMOL/L (ref 3.7–5.3)
RBC # BLD AUTO: 3.13 M/UL (ref 4–5.2)
SODIUM SERPL-SCNC: 138 MMOL/L (ref 135–144)
WBC OTHER # BLD: 5.3 K/UL (ref 3.5–11)

## 2025-04-01 PROCEDURE — 82947 ASSAY GLUCOSE BLOOD QUANT: CPT

## 2025-04-01 PROCEDURE — 36415 COLL VENOUS BLD VENIPUNCTURE: CPT

## 2025-04-01 PROCEDURE — 83735 ASSAY OF MAGNESIUM: CPT

## 2025-04-01 PROCEDURE — 85025 COMPLETE CBC W/AUTO DIFF WBC: CPT

## 2025-04-01 PROCEDURE — 99238 HOSP IP/OBS DSCHRG MGMT 30/<: CPT | Performed by: INTERNAL MEDICINE

## 2025-04-01 PROCEDURE — 80048 BASIC METABOLIC PNL TOTAL CA: CPT

## 2025-04-01 ASSESSMENT — PAIN SCALES - GENERAL: PAINLEVEL_OUTOF10: 0

## 2025-04-01 NOTE — PLAN OF CARE
Problem: Chronic Conditions and Co-morbidities  Goal: Patient's chronic conditions and co-morbidity symptoms are monitored and maintained or improved  3/31/2025 2342 by Mavis Taylor, RN  Outcome: Progressing  Flowsheets (Taken 3/31/2025 2000)  Care Plan - Patient's Chronic Conditions and Co-Morbidity Symptoms are Monitored and Maintained or Improved: Monitor and assess patient's chronic conditions and comorbid symptoms for stability, deterioration, or improvement     Problem: Discharge Planning  Goal: Discharge to home or other facility with appropriate resources  3/31/2025 2342 by Mavis Taylor, RN  Outcome: Progressing  Flowsheets (Taken 3/31/2025 2000)  Discharge to home or other facility with appropriate resources:   Identify barriers to discharge with patient and caregiver   Arrange for needed discharge resources and transportation as appropriate   Identify discharge learning needs (meds, wound care, etc)   Refer to discharge planning if patient needs post-hospital services based on physician order or complex needs related to functional status, cognitive ability or social support system     Problem: Pain  Goal: Verbalizes/displays adequate comfort level or baseline comfort level  3/31/2025 2342 by Mavis Taylor, RN  Outcome: Progressing     Problem: Safety - Adult  Goal: Free from fall injury  3/31/2025 2342 by Mavis Taylor, RN  Outcome: Progressing     Problem: ABCDS Injury Assessment  Goal: Absence of physical injury  3/31/2025 2342 by Mavis Taylor, RN  Outcome: Progressing

## 2025-04-01 NOTE — DISCHARGE SUMMARY
[facility]  Office: [phone]  Mo Scales DO, Pedro Pablo Cadet DO, Doc Garrett DO, Noel Lyle DO, Livan Beltran MD, Mayra Haider MD, Beverly Payton MD, Devorah Robert MD,  Perico Hirsch MD, Emmy Garcia MD, Reza Vora MD,  Geovany Martell DO, Liana Grissom MD, Jacob Rivera MD, Alvaro Scales DO, Chey Rapp MD,  Yan Morales DO, Nichole Nova MD, Jessica Bergman MD, Cheyenne Quiros MD,  Laurent Houser MD, Rosibel Floyd MD, Randal Shea MD, Matilde Champagne MD, Bryce Carpenter MD, Sophia Haro MD, Adriano Ruiz DO, Chaitanya Ortega MD, Geovany Phillip MD, Mohsin Reza, MD, Curtis Peñaloza MD, Shirley Waterhouse, CNP,  Gregoria Milner CNP, Adriano Servin, CNP,  Ching Ott, DNP, Cathy Hickman, CNP, Alicia Hernandez, CNP, Mirna Ceja, CNP, Katie Koenig, CNP, Mavis Carney, PA-C, Chelsea Au, CNP, Dashawn Ireland, CNP,  Bailey Stephen, CNP, Leila Brasher, CNP, Todd Chavarria, PA-C, Mariam Tomlinson, CNP,  Darling Rodriguez, CNS, Lizzette Ozuna, CNP, Yaz Das, CNP,   Livier Guzman, CNP         [facility]   IN-PATIENT SERVICE   [facility]    Discharge Summary     Patient ID: Angelica Guzman  :  1950   MRN: [id number]     ACCOUNT:  [id number]   Patient's PCP: Catarino Luciano MD  Admit Date: 3/27/2025   Discharge Date: 2025     Length of Stay: 4  Code Status:  Full Code  Admitting Physician: No admitting provider for patient encounter.  Discharge Physician: Doc Garrett DO     Active Discharge Diagnoses:     Hospital Problem Lists:  Principal Problem:    Partial small bowel obstruction (HCC)  Active Problems:    Malignant gastrointestinal stromal tumor (HCC)    Hypercholesterolemia    Essential hypertension    Recurrent major depressive disorder, in full remission    Type 2 diabetes mellitus with hypoglycemia without coma, without long-term current use of insulin (HCC)  Resolved Problems:    * No resolved hospital problems.

## 2025-04-01 NOTE — PROGRESS NOTES
General Surgery:  Daily Progress Note                   PATIENT NAME: Angelica Guzman     TODAY'S DATE: 3/29/2025, 1:21 PM  CC:  Abdominal pain    SUBJECTIVE:     Pt seen and examined at bedside.  No acute events overnight.  Patient is intermittently passing flatus and had a bowel movement last night.  Patient's NG has been clamped for some time and since then has been having a little increased abdominal discomfort.    OBJECTIVE:   VITALS:  BP (!) 140/58   Pulse 77   Temp 97.7 °F (36.5 °C)   Resp 18   Ht 1.651 m (5' 5\")   Wt 84.2 kg (185 lb 10 oz)   SpO2 99%   BMI 30.89 kg/m²      INTAKE/OUTPUT:      Intake/Output Summary (Last 24 hours) at 3/29/2025 1321  Last data filed at 3/29/2025 0920  Gross per 24 hour   Intake 25 ml   Output 1550 ml   Net -1525 ml       PHYSICAL EXAM:  General Appearance: awake, alert, oriented, in no acute distress  HEENT:  Normocephalic, atraumatic, mucus membranes moist, NGT  Heart: Regular rate and rhythm  Lungs: Equal chest rise bilaterally, no accessory muscle use  Abdomen:soft, mildly distended, some tenderness to palpation in the mid abdomen, palpable mass left mid abdomen. No rebound, guarding, or rigidity   Extremities: No cyanosis, pitting edema, rashes noted.    Skin: Skin color, texture, turgor normal. No rashes or lesions.    Data:  CBC with Differential:    Lab Results   Component Value Date/Time    WBC 7.2 03/29/2025 06:49 AM    RBC 3.24 03/29/2025 06:49 AM    RBC 3.86 04/29/2024 08:46 AM    HGB 9.8 03/29/2025 06:49 AM    HCT 29.1 03/29/2025 06:49 AM     03/29/2025 06:49 AM    MCV 89.9 03/29/2025 06:49 AM    MCH 30.2 03/29/2025 06:49 AM    MCHC 33.5 03/29/2025 06:49 AM    RDW 17.1 03/29/2025 06:49 AM    NRBC 0.00 02/26/2025 09:25 AM    LYMPHOPCT 11 03/29/2025 06:49 AM    LYMPHOPCT 13.5 02/08/2022 01:58 PM    MONOPCT 8 03/29/2025 06:49 AM    EOSPCT 1 03/29/2025 06:49 AM    BASOPCT 0 03/29/2025 06:49 AM    MONOSABS 0.60 03/29/2025 06:49 AM    LYMPHSABS 0.80 
  General Surgery:  Daily Progress Note                   PATIENT NAME: Angelica Guzman     TODAY'S DATE: 3/30/2025, 1:28 PM  CC:  Abdominal pain    SUBJECTIVE:     Pt seen and examined at bedside.  No acute events overnight.  Patient is passing some flatus but has not had a bowel movement since Friday.  Denies nausea or emesis.  Abdominal pain still present but mild overall significantly improved.    OBJECTIVE:   VITALS:  BP (!) 137/56   Pulse 73   Temp 97.3 °F (36.3 °C) (Axillary)   Resp 18   Ht 1.651 m (5' 5\")   Wt 83.5 kg (184 lb 1.4 oz)   SpO2 99%   BMI 30.63 kg/m²      INTAKE/OUTPUT:      Intake/Output Summary (Last 24 hours) at 3/30/2025 1328  Last data filed at 3/30/2025 1239  Gross per 24 hour   Intake 1039.69 ml   Output 1675 ml   Net -635.31 ml       PHYSICAL EXAM:  General Appearance: awake, alert, oriented, in no acute distress  HEENT:  Normocephalic, atraumatic, mucus membranes moist, NGT  Heart: Regular rate and rhythm  Lungs: Equal chest rise bilaterally, no accessory muscle use  Abdomen:soft, mildly distended, some mild tenderness to palpation in the left mid abdomen, palpable mass left mid abdomen. No rebound, guarding, or rigidity   Extremities: No cyanosis, pitting edema, rashes noted.    Skin: Skin color, texture, turgor normal. No rashes or lesions.    Data:  CBC with Differential:    Lab Results   Component Value Date/Time    WBC 9.4 03/30/2025 08:07 AM    RBC 3.46 03/30/2025 08:07 AM    RBC 3.86 04/29/2024 08:46 AM    HGB 10.1 03/30/2025 08:07 AM    HCT 32.7 03/30/2025 08:07 AM     03/30/2025 08:07 AM    MCV 94.5 03/30/2025 08:07 AM    MCH 29.2 03/30/2025 08:07 AM    MCHC 30.9 03/30/2025 08:07 AM    RDW 17.0 03/30/2025 08:07 AM    NRBC 0.00 02/26/2025 09:25 AM    LYMPHOPCT 10 03/30/2025 08:07 AM    LYMPHOPCT 13.5 02/08/2022 01:58 PM    MONOPCT 7 03/30/2025 08:07 AM    EOSPCT 2 03/30/2025 08:07 AM    BASOPCT 0 03/30/2025 08:07 AM    MONOSABS 0.69 03/30/2025 08:07 AM    LYMPHSABS 
  General Surgery:  Daily Progress Note                   PATIENT NAME: Angelica Guzman     TODAY'S DATE: 3/31/2025, 7:42 AM  CC:  Abdominal pain    SUBJECTIVE:     Pt seen and examined at bedside.  No acute events overnight.  VSS. Febrile to 101.1 (38.4) yesterday. Patient reports some increased sneezing and sinus congestion. States she feels \"stuffy\" this morning. NGT removed yesterday. Denies any significant abdominal pain. Reports having had a  loose bowel movement yesterday with some flatus. None yet this morning. Tolerating clear liquids without any nausea or vomiting.     OBJECTIVE:   VITALS:  BP (!) 130/55   Pulse 75   Temp 98.5 °F (36.9 °C) (Oral)   Resp 17   Ht 1.651 m (5' 5\")   Wt 83.5 kg (184 lb 1.4 oz)   SpO2 100%   BMI 30.63 kg/m²      INTAKE/OUTPUT:      Intake/Output Summary (Last 24 hours) at 3/31/2025 0742  Last data filed at 3/30/2025 1239  Gross per 24 hour   Intake --   Output 475 ml   Net -475 ml       PHYSICAL EXAM:  General Appearance: awake, alert, no apparent distress.   HEENT:  Normocephalic, atraumatic, mucus membranes moist  Heart: Regular rate and rhythm  Lungs: no acute respiratory distress or accessory muscle use   Abdomen:soft, non-distended, mild tenderness over palpable mass left mid abdomen. No rebound, guarding, or rigidity   Extremities: No cyanosis, pitting edema, rashes noted.    Skin: Skin color, texture, turgor normal. No rashes or lesions.    Data:  CBC with Differential:    Lab Results   Component Value Date/Time    WBC 6.4 03/31/2025 06:07 AM    RBC 3.07 03/31/2025 06:07 AM    RBC 3.86 04/29/2024 08:46 AM    HGB 9.1 03/31/2025 06:07 AM    HCT 27.3 03/31/2025 06:07 AM     03/31/2025 06:07 AM    MCV 88.7 03/31/2025 06:07 AM    MCH 29.6 03/31/2025 06:07 AM    MCHC 33.4 03/31/2025 06:07 AM    RDW 17.0 03/31/2025 06:07 AM    NRBC 0.00 02/26/2025 09:25 AM    LYMPHOPCT 14 03/31/2025 06:07 AM    LYMPHOPCT 13.5 02/08/2022 01:58 PM    MONOPCT 10 03/31/2025 06:07 AM    
Discharge instructions reviewed, pt verbalized understanding. Iv removed. Pt ambulated with staff to entrance B to private vehicle.[id number]  
[facility]  Office: [phone]  Mo Scales DO, Pedro Pablo Cadet DO, Doc Garrett DO, Noel Lyle DO, Livan Beltran MD, Mayra Haider MD, Beverly Payton MD, Devorah Robert MD,  Perico Hirsch MD, Emmy Garcia MD, Reza Vora MD,  Geovany Martell DO, Liana Grissom MD, Jacob Rivera MD, Alvaro Scales DO, Chey Rapp MD,  Yan Morales DO, Nichole Nova MD, Jessica Bergman MD, Cheyenne Quiros MD,  Laurent Houser MD, Rosibel Floyd MD, Randal Shea MD, Matilde Champagne MD, Bryce Carpenter MD, Sophia Haro MD, Adriano Ruiz DO, Chaitanya Ortega MD, Geovany Phillip MD, Mohsin Reza, MD, Curtis Peñaloza MD, Shirley Waterhouse, CNP,  Gregoria Milner, CNP, Adriano Servin, CNP,  Ching tOt, DNP, Cathy Hickman, CNP, Alicia Hernandez, CNP, Mirna Ceja, CNP, Katie Koenig, CNP, Mavis Carney, PA-C, Chelsea Au, CNP, Dashawn Ireland, CNP,  Bailey Stephen, CNP, Leila Brasher, CNP, Mariam Tomlinson, CNP,  Darling Rodriguez, CNS, Lizzette Ozuna CNP, Yaz Das, CNP,   Livier Guzman, CNP         [facility]   IN-PATIENT SERVICE   [facility]    Progress Note    3/29/2025    10:02 AM    Name:   Angelica Guzman  MRN:     [id number]     Acct:      [id number]   Room:   304/304-01   Day:  1  Admit Date:  3/27/2025 11:35 PM    PCP:   Catarino Luciano MD  Code Status:  Full Code    Subjective:     C/C:   Chief Complaint   Patient presents with    Abdominal Pain     Pt reports pain in abdomen started this afternoon, worsening. Vomited en route and very diaphoretic, reports dizziness, headache, and numbness in left arm, bilateral legs, left worse than right     Interval History Status: improved.     Patient seen examined at bedside.  Overall she states that she is feeling little better today.  Had a small bowel movement last night.  Otherwise no complaints.  NG tube currently clamped still having dark brown output    Brief History:     74-year-old female past medical 
Occupational Therapy    [facility]  Occupational Therapy Not Seen Note    DATE: 3/29/2025    NAME: Angelica Guzman  MRN: [id number]   : 1950      Patient not seen this date for Occupational Therapy due to:    Patient independent with ADLs and functional tasks with no acute OT needs. Will defer OT evaluation at this time. Please reorder OT if future needs arise.     Electronically signed by Bart Fregoso OT on 3/29/2025 at 10:33 AM    
Physical Therapy        Physical Therapy Cancel Note      DATE: 3/29/2025    NAME: Angelica Guzman  MRN: [id number]   : 1950      Patient not seen this date for Physical Therapy due to:    Pt reports independence walking in room and independence with all bathroom activities. Pt denies need for acute therapy. D/C PT      Electronically signed by LINDA LEMUS PT on 3/29/2025 at 10:15 AM     
[facility]  Office: [phone]  Mo Scales DO, Pedro Pablo Cadet, DO, Doc Garrett DO, Noel Lyle DO, Livan Beltran MD, Mayra Haider MD, Beverly Payton MD, Devorah Robert MD,  Perico Hirsch MD, Emmy Garcia MD, Reza Voar MD,  Geovany Martell DO, Liana Grissom MD, Jacob Rivera MD, Alvaro Scales DO, Chey Rapp MD,  Yan Morales DO, Nichole Nova MD, Jessica Bergman MD, Cheyenne Quiros MD,  Laurent Houser MD, Rosibel Floyd MD, Randal Shea MD, Matilde Champagne MD, Bryce Carpenter MD, Sophia Haro MD, Adriano Ruiz DO, Chaitanya Ortega MD, Geovany Phillip MD, Mohsin Reza, MD, Curtis Peñaloza MD, Shirley Waterhouse, CNP,  Gregoria Milner, CNP, Adriano Servin, CNP,  Ching Ott, DNP, Cathy Hickman, CNP, Alicia Hernandez, CNP, Mirna Ceja, CNP, Katie Koenig, CNP, Mavis Carney, PA-C, Chelsea Au, CNP, Dashawn Ireland, CNP,  Bailey Stephen, CNP, Leila Brasher, CNP, Mariam Tomlinson, CNP,  Darling Rodriguez, CNS, Lizzette Ozuna CNP, Yaz Das, CNP,   Livier Guzman, CNP         [facility]   IN-PATIENT SERVICE   [facility]    Progress Note    3/31/2025    9:51 AM    Name:   Angelica Guzman  MRN:     [id number]     Acct:      [id number]   Room:   [facility]/304-01   Day:  3  Admit Date:  3/27/2025 11:35 PM    PCP:   Catarino Luciano MD  Code Status:  Full Code    Subjective:     C/C:   Chief Complaint   Patient presents with    Abdominal Pain     Pt reports pain in abdomen started this afternoon, worsening. Vomited en route and very diaphoretic, reports dizziness, headache, and numbness in left arm, bilateral legs, left worse than right     Interval History Status: improved.     Patient seen examined at bedside. NG tube removed. Doing well. Passing flatus.     Brief History:     74-year-old female past medical history of hypertension, hypercholesterolemia, GIST on Gleevec, depression, diabetes type 2 presents with abdominal pain found to have small 
remission [F33.42] 10/01/2020    Type 2 diabetes mellitus with hypoglycemia without coma, without long-term current use of insulin (HCC) [E11.649]     Essential hypertension [I10] 12/14/2015    Hypercholesterolemia [E78.00] 05/01/2015       74 y.o. female with metastatic GIST with a likely partial small bowel obstruction.    Plan:  No acute surgical intervention indicated at this time.  Stable for discharge from general surgery perspective.  Continue regular, low fat diet  Encourage ambulation, out of bed, would encourage patient to walk the [place]  General Surgery will continue to follow while inpatient    Electronically signed by Chinyere Sorenson DO  on 4/1/2025 at 6:24 AM     I have reviewed the resident's note and I have reviewed patient's chart. I have discussed the findings, established the care plan and recommendations with Resident.    Electronically signed by Oliver Baker IV, DO on 4/1/25 at 1:29 PM EDT   
FINAL 10/11/2024 10:15 AM       Radiology:  XR ABDOMEN FOR NG/OG/NE TUBE PLACEMENT  Result Date: 3/28/2025  Enteric tube tip terminates in the gastric fundus or proximal by body. Improved small bowel dilatation within the upper abdomen.     CT ABDOMEN PELVIS W IV CONTRAST Additional Contrast? None  Result Date: 3/28/2025  1. Again identified is a partial small bowel obstruction with multiple dilated loops of bowel seen with associated adjacent mesenteric edema and interloop fluid with a component of enteritis. 2. Prominent wall thickness of portions of the colon may be related to under distension versus colitis. 3. Hepatic metastatic disease is again identified, without significant interval change as discussed above. 4. There is a similar large lobulated heterogeneously enhancing mass within the abdomen and pelvis as demonstrated on the previous exam. 5. Small volume ascites throughout the abdomen and pelvis.     XR CHEST PORTABLE  Result Date: 3/28/2025  No acute airspace disease identified.       Physical Examination:       General appearance:  alert, cooperative NG tube grossly in place  Mental Status:  oriented to person, place and time and normal affect  Lungs:  clear to auscultation bilaterally, normal effort  Heart:  regular rate and rhythm, no murmur  Abdomen:  soft, nontender, nondistended, slow but present bowel sounds  Extremities:  no edema, redness, tenderness in the calves  Skin:  no gross lesions, rashes, induration    Assessment:     Hospital Problems           Last Modified POA    * (Principal) Partial small bowel obstruction (HCC) 3/28/2025 Yes    Malignant gastrointestinal stromal tumor (HCC) 3/28/2025 Yes    Hypercholesterolemia 3/29/2025 Yes    Essential hypertension 3/28/2023 Yes    Recurrent major depressive disorder, in full remission 3/29/2025 Yes    Type 2 diabetes mellitus with hypoglycemia without coma, without long-term current use of insulin (HCC) 3/28/2025 Yes       Plan:     Small 
Value Date/Time    CULTURE FINAL 10/11/2024 10:15 AM       Radiology:  XR ABDOMEN (KUB) (SINGLE AP VIEW)  Result Date: 3/30/2025  Negative study.     XR ABDOMEN (KUB) (SINGLE AP VIEW)  Result Date: 3/29/2025  1. Nonobstructive bowel gas pattern. 2. Tip of an intestinal tube is visualized within the gastric fundus.     XR ABDOMEN FOR NG/OG/NE TUBE PLACEMENT  Result Date: 3/28/2025  Enteric tube tip terminates in the gastric fundus or proximal by body. Improved small bowel dilatation within the upper abdomen.     CT ABDOMEN PELVIS W IV CONTRAST Additional Contrast? None  Result Date: 3/28/2025  1. Again identified is a partial small bowel obstruction with multiple dilated loops of bowel seen with associated adjacent mesenteric edema and interloop fluid with a component of enteritis. 2. Prominent wall thickness of portions of the colon may be related to under distension versus colitis. 3. Hepatic metastatic disease is again identified, without significant interval change as discussed above. 4. There is a similar large lobulated heterogeneously enhancing mass within the abdomen and pelvis as demonstrated on the previous exam. 5. Small volume ascites throughout the abdomen and pelvis.     XR CHEST PORTABLE  Result Date: 3/28/2025  No acute airspace disease identified.       Physical Examination:     General appearance:  alert, cooperative and no distress  Mental Status:  oriented to person, place and time and normal affect  Lungs:  clear to auscultation bilaterally, normal effort  Heart:  regular rate and rhythm, no murmur  Abdomen:  soft, nontender, nondistended, normal bowel sounds, no masses, hepatomegaly, splenomegaly  Extremities:  no edema, redness, tenderness in the calves  Skin:  no gross lesions, rashes, induration    Assessment:     Hospital Problems           Last Modified POA    * (Principal) Partial small bowel obstruction (HCC) 3/28/2025 Yes    Malignant gastrointestinal stromal tumor (HCC) 3/28/2025 Yes

## 2025-04-02 ENCOUNTER — CARE COORDINATION (OUTPATIENT)
Dept: CARE COORDINATION | Age: 75
End: 2025-04-02

## 2025-04-02 NOTE — CARE COORDINATION
Care Transitions Note    Initial Call - Call within 2 business days of discharge: Yes    Attempted to reach patient for transitions of care follow up. Unable to reach patient.    Outreach Attempts:   HIPAA compliant voicemail left for patient.     Patient: Angelica HUGHES Large    Patient : 1950   MRN: [id number]    Reason for Admission: Partial small bowel obstruction   Discharge Date: 25  RURS: Readmission Risk Score: 11.5    Last Discharge Facility       Date Complaint Diagnosis Description Type Department Provider    3/27/25 Abdominal Pain Partial small bowel obstruction (HCC) ED to Hosp-Admission (Discharged) (ADMITTED) [facility] 3 Doc Park DO; Madeleine Acevedo...            Was this an external facility discharge? No    Follow Up Appointment:   Patient does not have a follow up appointment scheduled at time of call.  Pt with non [facility] provider, unable to route message      Plan for follow-up on next business day.      KINSEY CERON RN    Care Transitions Note    Initial Call - Call within 2 business days of discharge: Yes    Patient Current Location:  Home: [address] [place] [address]    Care Transition Nurse contacted the patient by telephone to perform post hospital discharge assessment, verified name and  as identifiers.  Provided introduction to self, and explanation of the Care Transition Nurse role.    Patient: Angelica HUGHES Large    Patient : 1950   MRN: [id number]    Reason for Admission: Partial small bowel obstruction  Discharge Date: 25  RURS: Readmission Risk Score: 11.5      Last Discharge Facility       Date Complaint Diagnosis Description Type Department Provider    3/27/25 Abdominal Pain Partial small bowel obstruction (HCC) ED to Hosp-Admission (Discharged) (ADMITTED) [facility] 3 Doc Park DO; Madeleine Acevedo...            Was this an external facility discharge? No    Additional needs identified to be addressed with provider   No needs identified

## 2025-04-03 ENCOUNTER — CARE COORDINATION (OUTPATIENT)
Dept: CARE COORDINATION | Age: 75
End: 2025-04-03

## 2025-04-03 NOTE — CARE COORDINATION
Care Transitions Note    Follow Up Call     Attempted to reach patient for transitions of care follow up.  Unable to reach patient.      Outreach Attempts:   HIPAA compliant voicemail left for patient.     Care Summary Note: 1st attempt.  Attempt to follow up if had bowel movement and if made HFU with provider      Angelica did call back and left voice mail that her abdominal pain was better.  She said that she was not taking her pain medication like she should.  She said that she started back up and the pain has resolved.  Attempt to call her back, but call went straight to voice mail      Follow Up Appointment:       Plan for follow-up on next business day.  based on severity of symptoms and risk factors. Plan for next call: symptom management-abdominal pain, bowel movements, N/V    KINSEY CERON RN

## 2025-04-04 ENCOUNTER — CARE COORDINATION (OUTPATIENT)
Dept: CARE COORDINATION | Age: 75
End: 2025-04-04

## 2025-04-04 NOTE — CARE COORDINATION
Care Transitions Note    Follow Up Call     Attempted to reach patient for transitions of care follow up.  Unable to reach patient.      Outreach Attempts:   Multiple attempts to contact patient at phone numbers on file.   HIPAA compliant voicemail left for patient.     Care Summary Note: final attempt    Follow Up Appointment:       No further follow-up call indicated based on severity of symptoms and risk factors. Plan for next call:  unable to reach    KINSEY CERON RN

## 2025-07-09 ENCOUNTER — HOSPITAL ENCOUNTER (INPATIENT)
Age: 75
LOS: 4 days | Discharge: HOME | DRG: 389 | End: 2025-07-13
Attending: EMERGENCY MEDICINE | Admitting: FAMILY MEDICINE
Payer: MEDICARE

## 2025-07-09 ENCOUNTER — APPOINTMENT (OUTPATIENT)
Dept: CT IMAGING | Age: 75
DRG: 389 | End: 2025-07-09
Payer: MEDICARE

## 2025-07-09 ENCOUNTER — APPOINTMENT (OUTPATIENT)
Dept: GENERAL RADIOLOGY | Age: 75
DRG: 389 | End: 2025-07-09
Payer: MEDICARE

## 2025-07-09 DIAGNOSIS — K56.609 SMALL BOWEL OBSTRUCTION (HCC): Primary | ICD-10-CM

## 2025-07-09 LAB
ALBUMIN SERPL-MCNC: 3.9 G/DL (ref 3.5–5.2)
ALBUMIN/GLOB SERPL: 1.4 {RATIO} (ref 1–2.5)
ALP SERPL-CCNC: 85 U/L (ref 35–104)
ALT SERPL-CCNC: <5 U/L (ref 10–35)
ANION GAP SERPL CALCULATED.3IONS-SCNC: 13 MMOL/L (ref 9–16)
AST SERPL-CCNC: 18 U/L (ref 10–35)
BASOPHILS # BLD: 0 K/UL (ref 0–0.2)
BASOPHILS NFR BLD: 0 % (ref 0–2)
BILIRUB SERPL-MCNC: 0.5 MG/DL (ref 0–1.2)
BNP SERPL-MCNC: 195 PG/ML (ref 0–125)
BUN SERPL-MCNC: 9 MG/DL (ref 8–23)
CALCIUM SERPL-MCNC: 8.8 MG/DL (ref 8.6–10.4)
CHLORIDE SERPL-SCNC: 105 MMOL/L (ref 98–107)
CO2 SERPL-SCNC: 23 MMOL/L (ref 20–31)
CREAT SERPL-MCNC: 0.8 MG/DL (ref 0.6–0.9)
EOSINOPHIL # BLD: 0.09 K/UL (ref 0–0.4)
EOSINOPHILS RELATIVE PERCENT: 1 % (ref 1–4)
ERYTHROCYTE [DISTWIDTH] IN BLOOD BY AUTOMATED COUNT: 16 % (ref 12.5–15.4)
GFR, ESTIMATED: 78 ML/MIN/1.73M2
GLUCOSE BLD-MCNC: 96 MG/DL (ref 65–105)
GLUCOSE SERPL-MCNC: 130 MG/DL (ref 74–99)
HCT VFR BLD AUTO: 34.8 % (ref 36–46)
HGB BLD-MCNC: 11.4 G/DL (ref 12–16)
INR PPP: 1 (ref 0.9–1.2)
LACTATE BLDV-SCNC: 2.3 MMOL/L (ref 0.5–2.2)
LIPASE SERPL-CCNC: 23 U/L (ref 13–60)
LYMPHOCYTES NFR BLD: 0.44 K/UL (ref 1–4.8)
LYMPHOCYTES RELATIVE PERCENT: 5 % (ref 24–44)
MCH RBC QN AUTO: 29.1 PG (ref 26–34)
MCHC RBC AUTO-ENTMCNC: 32.8 G/DL (ref 31–37)
MCV RBC AUTO: 88.7 FL (ref 80–100)
MONOCYTES NFR BLD: 0.18 K/UL (ref 0.1–0.8)
MONOCYTES NFR BLD: 2 % (ref 1–7)
MORPHOLOGY: ABNORMAL
MORPHOLOGY: ABNORMAL
NEUTROPHILS NFR BLD: 92 % (ref 36–66)
NEUTS SEG NFR BLD: 8.09 K/UL (ref 1.8–7.7)
PLATELET # BLD AUTO: 321 K/UL (ref 140–450)
PMV BLD AUTO: 6.2 FL (ref 6–12)
POTASSIUM SERPL-SCNC: 3.6 MMOL/L (ref 3.7–5.3)
PROT SERPL-MCNC: 6.6 G/DL (ref 6.6–8.7)
PROTHROMBIN TIME: 14 SEC (ref 11.8–14.6)
RBC # BLD AUTO: 3.93 M/UL (ref 4–5.2)
SODIUM SERPL-SCNC: 141 MMOL/L (ref 136–145)
WBC OTHER # BLD: 8.8 K/UL (ref 3.5–11)

## 2025-07-09 PROCEDURE — 83605 ASSAY OF LACTIC ACID: CPT

## 2025-07-09 PROCEDURE — 83690 ASSAY OF LIPASE: CPT

## 2025-07-09 PROCEDURE — 96374 THER/PROPH/DIAG INJ IV PUSH: CPT

## 2025-07-09 PROCEDURE — 6360000002 HC RX W HCPCS: Performed by: EMERGENCY MEDICINE

## 2025-07-09 PROCEDURE — 85025 COMPLETE CBC W/AUTO DIFF WBC: CPT

## 2025-07-09 PROCEDURE — 6360000002 HC RX W HCPCS: Performed by: STUDENT IN AN ORGANIZED HEALTH CARE EDUCATION/TRAINING PROGRAM

## 2025-07-09 PROCEDURE — 80053 COMPREHEN METABOLIC PANEL: CPT

## 2025-07-09 PROCEDURE — 2580000003 HC RX 258

## 2025-07-09 PROCEDURE — 2500000003 HC RX 250 WO HCPCS: Performed by: EMERGENCY MEDICINE

## 2025-07-09 PROCEDURE — 99285 EMERGENCY DEPT VISIT HI MDM: CPT

## 2025-07-09 PROCEDURE — 96375 TX/PRO/DX INJ NEW DRUG ADDON: CPT

## 2025-07-09 PROCEDURE — 82947 ASSAY GLUCOSE BLOOD QUANT: CPT

## 2025-07-09 PROCEDURE — 6370000000 HC RX 637 (ALT 250 FOR IP)

## 2025-07-09 PROCEDURE — 85610 PROTHROMBIN TIME: CPT

## 2025-07-09 PROCEDURE — 6360000004 HC RX CONTRAST MEDICATION: Performed by: EMERGENCY MEDICINE

## 2025-07-09 PROCEDURE — 36415 COLL VENOUS BLD VENIPUNCTURE: CPT

## 2025-07-09 PROCEDURE — 1210000000 HC MED SURG R&B

## 2025-07-09 PROCEDURE — 71045 X-RAY EXAM CHEST 1 VIEW: CPT

## 2025-07-09 PROCEDURE — 74177 CT ABD & PELVIS W/CONTRAST: CPT

## 2025-07-09 PROCEDURE — 83880 ASSAY OF NATRIURETIC PEPTIDE: CPT

## 2025-07-09 RX ORDER — POTASSIUM CHLORIDE 1500 MG/1
40 TABLET, EXTENDED RELEASE ORAL PRN
Status: DISCONTINUED | OUTPATIENT
Start: 2025-07-09 | End: 2025-07-13 | Stop reason: HOSPADM

## 2025-07-09 RX ORDER — FENTANYL CITRATE 50 UG/ML
50 INJECTION, SOLUTION INTRAMUSCULAR; INTRAVENOUS ONCE
Status: COMPLETED | OUTPATIENT
Start: 2025-07-09 | End: 2025-07-09

## 2025-07-09 RX ORDER — OXYCODONE HYDROCHLORIDE 5 MG/1
10 TABLET ORAL EVERY 4 HOURS PRN
Status: DISCONTINUED | OUTPATIENT
Start: 2025-07-09 | End: 2025-07-09

## 2025-07-09 RX ORDER — ACETAMINOPHEN 325 MG/1
650 TABLET ORAL EVERY 6 HOURS PRN
Status: DISCONTINUED | OUTPATIENT
Start: 2025-07-09 | End: 2025-07-13 | Stop reason: HOSPADM

## 2025-07-09 RX ORDER — ONDANSETRON 2 MG/ML
4 INJECTION INTRAMUSCULAR; INTRAVENOUS ONCE
Status: COMPLETED | OUTPATIENT
Start: 2025-07-09 | End: 2025-07-09

## 2025-07-09 RX ORDER — SODIUM CHLORIDE 9 MG/ML
INJECTION, SOLUTION INTRAVENOUS CONTINUOUS
Status: DISCONTINUED | OUTPATIENT
Start: 2025-07-09 | End: 2025-07-11

## 2025-07-09 RX ORDER — SODIUM CHLORIDE 0.9 % (FLUSH) 0.9 %
5-40 SYRINGE (ML) INJECTION PRN
Status: DISCONTINUED | OUTPATIENT
Start: 2025-07-09 | End: 2025-07-13 | Stop reason: HOSPADM

## 2025-07-09 RX ORDER — SODIUM CHLORIDE 9 MG/ML
INJECTION, SOLUTION INTRAVENOUS PRN
Status: DISCONTINUED | OUTPATIENT
Start: 2025-07-09 | End: 2025-07-13 | Stop reason: HOSPADM

## 2025-07-09 RX ORDER — SODIUM CHLORIDE 0.9 % (FLUSH) 0.9 %
5-40 SYRINGE (ML) INJECTION EVERY 12 HOURS SCHEDULED
Status: DISCONTINUED | OUTPATIENT
Start: 2025-07-09 | End: 2025-07-13 | Stop reason: HOSPADM

## 2025-07-09 RX ORDER — GLUCAGON 1 MG/ML
1 KIT INJECTION PRN
Status: DISCONTINUED | OUTPATIENT
Start: 2025-07-09 | End: 2025-07-13 | Stop reason: HOSPADM

## 2025-07-09 RX ORDER — MAGNESIUM SULFATE IN WATER 40 MG/ML
2000 INJECTION, SOLUTION INTRAVENOUS PRN
Status: DISCONTINUED | OUTPATIENT
Start: 2025-07-09 | End: 2025-07-13 | Stop reason: HOSPADM

## 2025-07-09 RX ORDER — DEXTROSE MONOHYDRATE 100 MG/ML
INJECTION, SOLUTION INTRAVENOUS CONTINUOUS PRN
Status: DISCONTINUED | OUTPATIENT
Start: 2025-07-09 | End: 2025-07-13 | Stop reason: HOSPADM

## 2025-07-09 RX ORDER — ACETAMINOPHEN 650 MG/1
650 SUPPOSITORY RECTAL EVERY 6 HOURS PRN
Status: DISCONTINUED | OUTPATIENT
Start: 2025-07-09 | End: 2025-07-13 | Stop reason: HOSPADM

## 2025-07-09 RX ORDER — KETOROLAC TROMETHAMINE 15 MG/ML
15 INJECTION, SOLUTION INTRAMUSCULAR; INTRAVENOUS EVERY 6 HOURS PRN
Status: DISCONTINUED | OUTPATIENT
Start: 2025-07-09 | End: 2025-07-13 | Stop reason: HOSPADM

## 2025-07-09 RX ORDER — SODIUM CHLORIDE 0.9 % (FLUSH) 0.9 %
10 SYRINGE (ML) INJECTION PRN
Status: DISCONTINUED | OUTPATIENT
Start: 2025-07-09 | End: 2025-07-13 | Stop reason: HOSPADM

## 2025-07-09 RX ORDER — IOPAMIDOL 755 MG/ML
75 INJECTION, SOLUTION INTRAVASCULAR
Status: COMPLETED | OUTPATIENT
Start: 2025-07-09 | End: 2025-07-09

## 2025-07-09 RX ORDER — POLYETHYLENE GLYCOL 3350 17 G/17G
17 POWDER, FOR SOLUTION ORAL DAILY PRN
Status: DISCONTINUED | OUTPATIENT
Start: 2025-07-09 | End: 2025-07-13 | Stop reason: HOSPADM

## 2025-07-09 RX ORDER — POTASSIUM CHLORIDE 7.45 MG/ML
10 INJECTION INTRAVENOUS PRN
Status: DISCONTINUED | OUTPATIENT
Start: 2025-07-09 | End: 2025-07-13 | Stop reason: HOSPADM

## 2025-07-09 RX ORDER — ONDANSETRON 2 MG/ML
4 INJECTION INTRAMUSCULAR; INTRAVENOUS EVERY 6 HOURS PRN
Status: DISCONTINUED | OUTPATIENT
Start: 2025-07-09 | End: 2025-07-13 | Stop reason: HOSPADM

## 2025-07-09 RX ORDER — IMATINIB MESYLATE 100 MG/1
300 TABLET, FILM COATED ORAL 2 TIMES DAILY
Status: DISCONTINUED | OUTPATIENT
Start: 2025-07-09 | End: 2025-07-13 | Stop reason: HOSPADM

## 2025-07-09 RX ORDER — ONDANSETRON 4 MG/1
4 TABLET, ORALLY DISINTEGRATING ORAL EVERY 8 HOURS PRN
Status: DISCONTINUED | OUTPATIENT
Start: 2025-07-09 | End: 2025-07-13 | Stop reason: HOSPADM

## 2025-07-09 RX ORDER — INSULIN LISPRO 100 [IU]/ML
0-4 INJECTION, SOLUTION INTRAVENOUS; SUBCUTANEOUS
Status: DISCONTINUED | OUTPATIENT
Start: 2025-07-09 | End: 2025-07-13 | Stop reason: HOSPADM

## 2025-07-09 RX ORDER — 0.9 % SODIUM CHLORIDE 0.9 %
80 INTRAVENOUS SOLUTION INTRAVENOUS ONCE
Status: DISCONTINUED | OUTPATIENT
Start: 2025-07-09 | End: 2025-07-13 | Stop reason: HOSPADM

## 2025-07-09 RX ADMIN — HYDROMORPHONE HYDROCHLORIDE 0.5 MG: 1 INJECTION, SOLUTION INTRAMUSCULAR; INTRAVENOUS; SUBCUTANEOUS at 22:49

## 2025-07-09 RX ADMIN — Medication 80 ML: at 12:16

## 2025-07-09 RX ADMIN — SODIUM CHLORIDE, PRESERVATIVE FREE 10 ML: 5 INJECTION INTRAVENOUS at 12:16

## 2025-07-09 RX ADMIN — IOPAMIDOL 75 ML: 755 INJECTION, SOLUTION INTRAVENOUS at 12:15

## 2025-07-09 RX ADMIN — KETOROLAC TROMETHAMINE 15 MG: 15 INJECTION, SOLUTION INTRAMUSCULAR; INTRAVENOUS at 20:42

## 2025-07-09 RX ADMIN — ONDANSETRON 4 MG: 2 INJECTION, SOLUTION INTRAMUSCULAR; INTRAVENOUS at 10:45

## 2025-07-09 RX ADMIN — SODIUM CHLORIDE: 0.9 INJECTION, SOLUTION INTRAVENOUS at 18:39

## 2025-07-09 RX ADMIN — FENTANYL CITRATE 50 MCG: 50 INJECTION INTRAMUSCULAR; INTRAVENOUS at 10:44

## 2025-07-09 RX ADMIN — PHENOL 1 SPRAY: 1.5 LIQUID ORAL at 22:20

## 2025-07-09 RX ADMIN — HYDROMORPHONE HYDROCHLORIDE 0.5 MG: 1 INJECTION, SOLUTION INTRAMUSCULAR; INTRAVENOUS; SUBCUTANEOUS at 15:23

## 2025-07-09 ASSESSMENT — PAIN - FUNCTIONAL ASSESSMENT: PAIN_FUNCTIONAL_ASSESSMENT: 0-10

## 2025-07-09 ASSESSMENT — PAIN SCALES - GENERAL
PAINLEVEL_OUTOF10: 8
PAINLEVEL_OUTOF10: 7
PAINLEVEL_OUTOF10: 4
PAINLEVEL_OUTOF10: 8
PAINLEVEL_OUTOF10: 6

## 2025-07-09 ASSESSMENT — PAIN DESCRIPTION - DESCRIPTORS
DESCRIPTORS: ACHING
DESCRIPTORS: SHARP
DESCRIPTORS: ACHING

## 2025-07-09 ASSESSMENT — PAIN DESCRIPTION - ORIENTATION
ORIENTATION: MID;LEFT
ORIENTATION: UPPER;LOWER

## 2025-07-09 ASSESSMENT — PAIN DESCRIPTION - LOCATION
LOCATION: ABDOMEN

## 2025-07-09 ASSESSMENT — PAIN DESCRIPTION - PAIN TYPE: TYPE: ACUTE PAIN

## 2025-07-09 NOTE — PROGRESS NOTES
Brief progress note reported.  Full consult to follow tomorrow.        Patient well-known to me.  She was referred to me in July 2019 for management of stage I triple negative breast cancer.  In April 2022, she was diagnosed with advanced GIST tumor with extensive/bulky disease below the diaphragm.  Her disease was deemed unresectable.  She was initiated on palliative systemic therapy shortly thereafter with imatinib 300 mg by mouth once daily.  Around August 2024, she was noted to have slight radiographic progression of disease.  Therefore, we adjusted her treatment and increase the dosage of imatinib to 300 mg by mouth twice daily.  Since that time, her disease has been radiographically stable and she has been symptomatically under decent control.  She does suffer from chronic pain syndrome.  Her insurance does not cover palliative care visits and her co-pays were financially toxic.  Therefore, she receives her pain management under my management in our clinic.    She occasionally will have abdominal symptoms similar to what she experienced earlier today.  Specifically, she will have intermittent episodes of bowel obstruction related to the bulky nature of her disease.  In my opinion, this is best treated nonsurgically if at all possible.    Agree with plan for bowel rest.  Agree with plan for NG tube placement to suction for decompression.    Okay to hold imatinib therapy during this admission.  We can resume this after discharge.    Defer management of nutrition (TPN) and IV fluid management to primary service.  Continue present management and supportive care as per other services.  Will follow along with you.  Call with questions.

## 2025-07-09 NOTE — CONSULTS
[facility]  GENERAL SURGERY  CONSULT NOTE    PATIENT: Angelica Guzman           : 1950  MRN: [id number]  ADMISSION DATE: 2025 10:01 AM   TODAY'S DATE: 25   LOS: 0     ATTENDING PHYSICIAN: Gigi Aguirre MD    REASON FOR CONSULT:  Small bowel obstruction    HISTORY OF PRESENT ILLNESS:  74 years old female with history of malignant GIST tumor, with liver metastasized, hypertension, depression and breast cancer presented hospital with abdominal pain.  Patient stated this morning she was having abdominal pain in the left upper quadrant and left lower quadrant with sharp pain.  She denies of having nausea or vomiting.  She had 1 large bowel movement today and multiple diarrhea afterward, and she continues to pass gas.  After she arrived to the emergency department, she stated her pain has improved.  Currently patient has NG tube placed for bowel decompression.  CT of abdomen pelvis with finding of small bowel obstruction with abrupt changes at the level of the left abdominal mass which measured as 19.7 cm.  This is the third time patient had small bowel obstruction and all were managed nonoperatively.  Patient is currently still on her medication imatinib and followed by oncologist for his GIST tumor.    PAST MEDICAL HISTORY:        Diagnosis Date    Breast cancer (HCC)     Depression 2015    HTN (hypertension) 2015    Hypercholesterolemia 2015    Malignant GIST (gastrointestinal stromal tumor) ([facility])     Type II or unspecified type diabetes mellitus without mention of complication, not stated as uncontrolled        PAST SURGICAL HISTORY:        Procedure Laterality Date    HYSTERECTOMY (CERVIX STATUS UNKNOWN)  2000    MASTECTOMY Left 2019       ALLERGIES:    Morphine and Atorvastatin    SOCIAL HISTORY   Social History     Tobacco Use    Smoking status: Never    Smokeless tobacco: Never   Substance Use Topics    Alcohol use: No    Drug use: No        FAMILY HISTORY:       Problem

## 2025-07-09 NOTE — ED NOTES
ED to inpatient nurses report      Chief Complaint:  Chief Complaint   Patient presents with    Abdominal Pain     Abdominal pains that started this morning.  Cramping and pain before and after having a large bowel movement.  History of abdominal cancer with METS and history of bowel obstructions.      Present to ED from: home    MOA:     LOC: alert and orientated to name, place, date  Mobility: Independent  Oxygen Baseline: 100% on room air    Current needs required: none   Pending ED orders: none  Present condition: stable    Why did the patient come to the ED? Abdominal pain    What is the plan? admission    Any procedures or intervention occur? Ct, Xray, blood work    Pertinent event(s)     Safety concerns??    CODE STATUS Prior    Diet Diet NPO    Mental Status:  Level of Consciousness: Alert (0)    Psych Assessment:   Psychosocial  Psychosocial (WDL): Within Defined Limits  Vital signs   Vitals:    07/09/25 1006 07/09/25 1500   BP: (!) 144/61    Pulse: 74    Resp: 18    Temp: 97.9 °F (36.6 °C) 97.9 °F (36.6 °C)   TempSrc: Oral    SpO2: 100%    Weight: 79 kg (174 lb 2.6 oz)    Height: 1.65 m (5' 4.96\")         Vitals:  Patient Vitals for the past 24 hrs:   BP Temp Temp src Pulse Resp SpO2 Height Weight   07/09/25 1500 -- 97.9 °F (36.6 °C) -- -- -- -- -- --   07/09/25 1006 (!) 144/61 97.9 °F (36.6 °C) Oral 74 18 100 % 1.65 m (5' 4.96\") 79 kg (174 lb 2.6 oz)      Visit Vitals  BP (!) 144/61   Pulse 74   Temp 97.9 °F (36.6 °C)   Resp 18   Ht 1.65 m (5' 4.96\")   Wt 79 kg (174 lb 2.6 oz)   SpO2 100%   BMI 29.02 kg/m²        LDAs:   Peripheral IV 07/09/25 Left;Anterior Forearm (Active)   Site Assessment Clean, dry & intact 07/09/25 1039   Line Status Blood return noted;Flushed;Normal saline locked;Specimen collected 07/09/25 1039   Phlebitis Assessment No symptoms 07/09/25 1039   Infiltration Assessment 0 07/09/25 1039   Alcohol Cap Used Yes 07/09/25 1039   Dressing Status New dressing applied 07/09/25 1039

## 2025-07-09 NOTE — ED PROVIDER NOTES
that at this time, there is a medical necessity for admission. Routine admission counseling was given and the patient and/or family/guardian understands the rationale surrounding the decision for admission.     I have reviewed the disposition diagnosis with the patient and/or their family/guardian. I have answered their questions and made a plan for admission through shared decision making. They voiced understanding of these plans and did not have any further questions or concerns.       FINAL IMPRESSION:     1. Small bowel obstruction (HCC)        DISPOSITION:   Admitted    PATIENT REFERRED TO:   No follow-up provider specified.    DISCHARGE MEDICATIONS:     New Prescriptions    No medications on file         Neris Jade DO   Emergency Physician Attending    (Please note that portions of this note were completed with a voice recognition program.  Efforts were made to edit the dictations but occasionally words are mis-transcribed.)       Neris Jade DO  07/09/25 [id number]

## 2025-07-09 NOTE — PROGRESS NOTES
Per [facility] approved formulary policy.     SGLT2's are only formulary with the indication of CKD or CHF therefore:    Please note that the  Empagliflozin (Jardiance) is non-formulary with indications of type 2 diabetes and has been discontinued while inpatient.    Please contact the pharmacy with any questions or concerns.  Thank you.  George Leal RPH, RPh/PharmD 7/9/2025 6:00 PM

## 2025-07-09 NOTE — H&P
[facility] Residency  Inpatient Service     HISTORY AND PHYSICAL EXAMINATION            Date:   7/9/2025  Patient name:  Angelica Guzman  Date of admission:  7/9/2025 10:01 AM  MRN:   [id number]  Account:  [id number]  YOB: 1950  PCP:    Catarino Luciano MD  Room:   10/[facility]  Code Status:    Full Code  hPOA Name:   Sis Merida (Daughter) [phone]    History Obtained From:     patient    History of Present Illness:   Angelica Guzman is a 74 y.o. female with past medical history of breast cancer status postmastectomy, advanced metastatic GIST tumor who follows up with outpatient with hematology/oncology, type II diabetes mellitus not on insulin, and depression presents to the ED for evaluation of abdominal pain and nausea. Patient states she woke up this morning with significant abdominal pain. She got up to use the restroom and reported having a large amount of formed stools. The 4 subsequent bowel movements were loose and dark in color. Reports associated nausea. She took her prescribed oxycodone with minimal relief of her pain. She has had 2 small bowel obstructions within the past 1.5 years and noted her pain was similar to those episodes. She was taken to the ED by her son for further evaluation.  Otherwise denies any fevers, chills, chest pain, difficulty breathing.    In the course of the ED, the patient's vitals were 97.9F, pulse 74, respirations 18, /61.  No leukocytosis WBC 8.8, Lactic acid 2.3, lipase 23. CT abdomen/pelvis revealed small-bowel obstruction with abrupt caliber change at the level of the unchanged 19.7cm left abdominal mass. Patient was admitted to inpatient service for management of her pain and decompression with NG tube. General surgery and hematology/oncology were consulted.     Of note, patient takes imatinib and is followed by Dr. Robles, oncology at the [facility].     Social History:     Social History

## 2025-07-10 PROBLEM — Z78.9 NONSMOKER: Status: ACTIVE | Noted: 2025-07-10

## 2025-07-10 LAB
ALBUMIN SERPL-MCNC: 3.2 G/DL (ref 3.5–5.2)
ALBUMIN/GLOB SERPL: 1.5 {RATIO} (ref 1–2.5)
ALP SERPL-CCNC: 67 U/L (ref 35–104)
ALT SERPL-CCNC: <5 U/L (ref 10–35)
ANION GAP SERPL CALCULATED.3IONS-SCNC: 7 MMOL/L (ref 9–16)
AST SERPL-CCNC: 14 U/L (ref 10–35)
BASOPHILS # BLD: 0 K/UL (ref 0–0.2)
BASOPHILS NFR BLD: 0 % (ref 0–2)
BILIRUB DIRECT SERPL-MCNC: 0.2 MG/DL (ref 0–0.2)
BILIRUB INDIRECT SERPL-MCNC: 0.2 MG/DL (ref 0–1)
BILIRUB SERPL-MCNC: 0.4 MG/DL (ref 0–1.2)
BUN SERPL-MCNC: 10 MG/DL (ref 8–23)
CA-I BLD-SCNC: 1.07 MMOL/L (ref 1.13–1.33)
CALCIUM SERPL-MCNC: 7.6 MG/DL (ref 8.6–10.4)
CHLORIDE SERPL-SCNC: 112 MMOL/L (ref 98–107)
CO2 SERPL-SCNC: 24 MMOL/L (ref 20–31)
CREAT SERPL-MCNC: 0.6 MG/DL (ref 0.6–0.9)
EOSINOPHIL # BLD: 0.19 K/UL (ref 0–0.4)
EOSINOPHILS RELATIVE PERCENT: 4 % (ref 1–4)
ERYTHROCYTE [DISTWIDTH] IN BLOOD BY AUTOMATED COUNT: 15.9 % (ref 12.5–15.4)
FERRITIN SERPL-MCNC: 483 NG/ML
FOLATE SERPL-MCNC: 14.2 NG/ML (ref 4.8–24.2)
GFR, ESTIMATED: >90 ML/MIN/1.73M2
GLUCOSE BLD-MCNC: 66 MG/DL (ref 65–105)
GLUCOSE BLD-MCNC: 77 MG/DL (ref 65–105)
GLUCOSE SERPL-MCNC: 85 MG/DL (ref 74–99)
HCT VFR BLD AUTO: 28.4 % (ref 36–46)
HGB BLD-MCNC: 9.2 G/DL (ref 12–16)
IRON SATN MFR SERPL: 21 % (ref 20–55)
IRON SERPL-MCNC: 33 UG/DL (ref 37–145)
LYMPHOCYTES NFR BLD: 0.85 K/UL (ref 1–4.8)
LYMPHOCYTES RELATIVE PERCENT: 18 % (ref 24–44)
MAGNESIUM SERPL-MCNC: 1.9 MG/DL (ref 1.6–2.4)
MCH RBC QN AUTO: 28.8 PG (ref 26–34)
MCHC RBC AUTO-ENTMCNC: 32.3 G/DL (ref 31–37)
MCV RBC AUTO: 89.2 FL (ref 80–100)
MONOCYTES NFR BLD: 0.52 K/UL (ref 0.1–1.2)
MONOCYTES NFR BLD: 11 % (ref 2–11)
MORPHOLOGY: NORMAL
NEUTROPHILS NFR BLD: 67 % (ref 36–66)
NEUTS SEG NFR BLD: 3.14 K/UL (ref 1.8–7.7)
PLATELET # BLD AUTO: 266 K/UL (ref 140–450)
PMV BLD AUTO: 6.2 FL (ref 6–12)
POTASSIUM SERPL-SCNC: 3.5 MMOL/L (ref 3.7–5.3)
PROT SERPL-MCNC: 5.3 G/DL (ref 6.6–8.7)
RBC # BLD AUTO: 3.18 M/UL (ref 4–5.2)
SODIUM SERPL-SCNC: 143 MMOL/L (ref 136–145)
TIBC SERPL-MCNC: 155 UG/DL (ref 250–450)
TRANSFERRIN SERPL-MCNC: 128 MG/DL (ref 200–360)
UNSATURATED IRON BINDING CAPACITY: 122 UG/DL (ref 112–347)
VIT B12 SERPL-MCNC: 512 PG/ML (ref 232–1245)
WBC OTHER # BLD: 4.7 K/UL (ref 3.5–11)

## 2025-07-10 PROCEDURE — 85025 COMPLETE CBC W/AUTO DIFF WBC: CPT

## 2025-07-10 PROCEDURE — 99223 1ST HOSP IP/OBS HIGH 75: CPT | Performed by: FAMILY MEDICINE

## 2025-07-10 PROCEDURE — 36415 COLL VENOUS BLD VENIPUNCTURE: CPT

## 2025-07-10 PROCEDURE — 82746 ASSAY OF FOLIC ACID SERUM: CPT

## 2025-07-10 PROCEDURE — 1210000000 HC MED SURG R&B

## 2025-07-10 PROCEDURE — 6360000002 HC RX W HCPCS

## 2025-07-10 PROCEDURE — 84466 ASSAY OF TRANSFERRIN: CPT

## 2025-07-10 PROCEDURE — 83550 IRON BINDING TEST: CPT

## 2025-07-10 PROCEDURE — 2580000003 HC RX 258

## 2025-07-10 PROCEDURE — 6360000002 HC RX W HCPCS: Performed by: STUDENT IN AN ORGANIZED HEALTH CARE EDUCATION/TRAINING PROGRAM

## 2025-07-10 PROCEDURE — 80076 HEPATIC FUNCTION PANEL: CPT

## 2025-07-10 PROCEDURE — 82330 ASSAY OF CALCIUM: CPT

## 2025-07-10 PROCEDURE — 82728 ASSAY OF FERRITIN: CPT

## 2025-07-10 PROCEDURE — 87086 URINE CULTURE/COLONY COUNT: CPT

## 2025-07-10 PROCEDURE — 81001 URINALYSIS AUTO W/SCOPE: CPT

## 2025-07-10 PROCEDURE — 82607 VITAMIN B-12: CPT

## 2025-07-10 PROCEDURE — 82947 ASSAY GLUCOSE BLOOD QUANT: CPT

## 2025-07-10 PROCEDURE — 80048 BASIC METABOLIC PNL TOTAL CA: CPT

## 2025-07-10 PROCEDURE — 83540 ASSAY OF IRON: CPT

## 2025-07-10 PROCEDURE — 83735 ASSAY OF MAGNESIUM: CPT

## 2025-07-10 RX ORDER — POTASSIUM CHLORIDE 7.45 MG/ML
10 INJECTION INTRAVENOUS
Status: DISPENSED | OUTPATIENT
Start: 2025-07-10 | End: 2025-07-10

## 2025-07-10 RX ADMIN — HYDROMORPHONE HYDROCHLORIDE 0.5 MG: 1 INJECTION, SOLUTION INTRAMUSCULAR; INTRAVENOUS; SUBCUTANEOUS at 04:53

## 2025-07-10 RX ADMIN — POTASSIUM CHLORIDE 10 MEQ: 7.46 INJECTION, SOLUTION INTRAVENOUS at 11:31

## 2025-07-10 RX ADMIN — POTASSIUM CHLORIDE 10 MEQ: 7.46 INJECTION, SOLUTION INTRAVENOUS at 08:20

## 2025-07-10 RX ADMIN — KETOROLAC TROMETHAMINE 15 MG: 15 INJECTION, SOLUTION INTRAMUSCULAR; INTRAVENOUS at 22:26

## 2025-07-10 RX ADMIN — HYDROMORPHONE HYDROCHLORIDE 0.5 MG: 1 INJECTION, SOLUTION INTRAMUSCULAR; INTRAVENOUS; SUBCUTANEOUS at 23:32

## 2025-07-10 RX ADMIN — POTASSIUM CHLORIDE 10 MEQ: 7.46 INJECTION, SOLUTION INTRAVENOUS at 10:05

## 2025-07-10 RX ADMIN — HYDROMORPHONE HYDROCHLORIDE 0.5 MG: 1 INJECTION, SOLUTION INTRAMUSCULAR; INTRAVENOUS; SUBCUTANEOUS at 16:44

## 2025-07-10 RX ADMIN — HYDROMORPHONE HYDROCHLORIDE 0.25 MG: 1 INJECTION, SOLUTION INTRAMUSCULAR; INTRAVENOUS; SUBCUTANEOUS at 12:35

## 2025-07-10 RX ADMIN — SODIUM CHLORIDE: 0.9 INJECTION, SOLUTION INTRAVENOUS at 14:51

## 2025-07-10 RX ADMIN — DEXTROSE 125 ML: 10 SOLUTION INTRAVENOUS at 16:53

## 2025-07-10 RX ADMIN — KETOROLAC TROMETHAMINE 15 MG: 15 INJECTION, SOLUTION INTRAMUSCULAR; INTRAVENOUS at 10:07

## 2025-07-10 RX ADMIN — SODIUM CHLORIDE: 0.9 INJECTION, SOLUTION INTRAVENOUS at 08:16

## 2025-07-10 ASSESSMENT — PAIN DESCRIPTION - DESCRIPTORS
DESCRIPTORS: ACHING
DESCRIPTORS: SHARP
DESCRIPTORS: ACHING;CRAMPING
DESCRIPTORS: SHARP
DESCRIPTORS: SHARP;ACHING
DESCRIPTORS: ACHING

## 2025-07-10 ASSESSMENT — PAIN DESCRIPTION - LOCATION
LOCATION: ABDOMEN
LOCATION: HEAD
LOCATION: ABDOMEN;HEAD
LOCATION: ABDOMEN
LOCATION: ABDOMEN
LOCATION: HEAD

## 2025-07-10 ASSESSMENT — PAIN SCALES - GENERAL
PAINLEVEL_OUTOF10: 8
PAINLEVEL_OUTOF10: 7
PAINLEVEL_OUTOF10: 5
PAINLEVEL_OUTOF10: 6
PAINLEVEL_OUTOF10: 3
PAINLEVEL_OUTOF10: 6
PAINLEVEL_OUTOF10: 5
PAINLEVEL_OUTOF10: 7

## 2025-07-10 ASSESSMENT — PAIN - FUNCTIONAL ASSESSMENT
PAIN_FUNCTIONAL_ASSESSMENT: PREVENTS OR INTERFERES SOME ACTIVE ACTIVITIES AND ADLS
PAIN_FUNCTIONAL_ASSESSMENT: ACTIVITIES ARE NOT PREVENTED
PAIN_FUNCTIONAL_ASSESSMENT: ACTIVITIES ARE NOT PREVENTED

## 2025-07-10 ASSESSMENT — PAIN DESCRIPTION - ORIENTATION
ORIENTATION: ANTERIOR
ORIENTATION: LEFT;MID
ORIENTATION: RIGHT;LEFT;LOWER
ORIENTATION: MID;LEFT
ORIENTATION: ANTERIOR

## 2025-07-10 NOTE — CONSULTS
[facility] Hematology and Oncology - Consult Note  7/10/2025, 10:26 AM    Admit Date: 7/9/2025  Referring Physician: Gigi Aguirre MD   PCP: Catarino Luciano MD    Reason for Consult: GIST    History of Present Illness:   74 year old female patient known to our service for her diagnoses of breast cancer and GIST tumor. She is an established patient of Dr. Robles, diagnosed with triple negative breast cancer in 2019, s/p R mastectomy f/b adjuvant chemotherapy through 10/2019. More recently, she was diagnosed with metastatic, unresectable GIST tumor in 4/2022. She was initiated on palliative systemic therapy with imatinib at that time. In 8/2024, she had slight radiographic progression. Her imatinib dosing was increased from 300mg QD to 300mg BID, with stable disease since that time. MRI abdomen 5/2025 showed slight progression, but decision was made to continue present course with repeat scans in the near future.     She presents to the hospital now with recurrent partial SBO in setting of her known intraabdominal malignancy with peritoneal carcinomatosis. General surgery following for the same. NGT in place, NPO, bowel rest. No surgical interventions planned. Imatinib on hold. We have added anemia workup, otherwise no inpatient oncologic plans.      Patient seen and examined at bedside. She looks and feels ill. She endorses abdominal discomfort but states it is presently well controlled. She is weak and fatigued. She denies any new or worsening symptoms otherwise. Maintained on room air without distress at rest. VS unremarkable, afebrile.     Impression/Plan:   Metastatic GIST  -Patient of Dr. Robles, diagnosed with metastatic, unresectable GIST tumor in 4/2022  -With known liver, lymph node, lung, and peritoneal metastases  -On Imatinib 300mg PO BID--> on hold this admission. Can discuss resuming at outpatient follow up  -No other inpatient plans  -Follow up with Dr. Robles after discharge    Hx Breast

## 2025-07-10 NOTE — PROGRESS NOTES
[facility] Residency  Inpatient Service     Progress Note  7/10/2025    7:43 AM    Name:   Angelica Guzman  MRN:     [id number]     Acct:      [id number]   Room:   [facility]/[address] [place] [address] Day:  1  Admit Date:  7/9/2025 10:01 AM    PCP:   Catarino Luciano MD  Code Status:  Full Code    Subjective:     Overnight events: Patient reported increased sore throat because of her NG tube.     Pt was examined at bedside this morning. She reports having ongoing abdominal pain but she notes this is currently managed by her pain regimen. She reports she has passed flatus but has not yet had a bowel movement today. She denies chest pain, shortness of breath, fever and chills this morning. She has no other complaints.     Medications:     Allergies:    Allergies   Allergen Reactions    Morphine     Atorvastatin        Current Meds:   Scheduled Meds:    potassium chloride  10 mEq IntraVENous Q1H    sodium chloride  80 mL IntraVENous Once    [Held by provider] FLUoxetine  40 mg Oral Daily    [Held by provider] imatinib  300 mg Oral BID    [Held by provider] rivaroxaban  10 mg Oral Daily with breakfast    sodium chloride flush  5-40 mL IntraVENous 2 times per day    insulin lispro  0-4 Units SubCUTAneous 4x Daily AC & HS     Continuous Infusions:    sodium chloride      sodium chloride 150 mL/hr at 07/09/25 1839    dextrose       PRN Meds: sodium chloride flush, sodium chloride flush, sodium chloride, potassium chloride **OR** potassium alternative oral replacement **OR** potassium chloride, magnesium sulfate, ondansetron **OR** ondansetron, polyethylene glycol, acetaminophen **OR** acetaminophen, glucose, dextrose bolus **OR** dextrose bolus, glucagon (rDNA), dextrose, ketorolac, HYDROmorphone **OR** HYDROmorphone, phenol    ROS:   ROS is negative except for points noted above.    Data:     Vitals:  BP (!) 110/48   Pulse 71   Temp 98.2 °F (36.8 °C) (Axillary)   Resp 18   Ht 1.65 m (5'

## 2025-07-10 NOTE — PROGRESS NOTES
General Surgery:  Daily Progress Note                    PATIENT NAME: Angelica Guzman     TODAY'S DATE: 7/10/2025, 8:16 AM  CC:  I feel a little better    SUBJECTIVE:     Pt seen and examined at bedside.vitals within normal. No nausea. Endorses passing gas overnight. No bowel movement here yet. She states her abdominal pain and bloating are a little better since ngt tube placement.     OBJECTIVE:   VITALS:  BP (!) 121/55   Pulse 70   Temp 99 °F (37.2 °C) (Oral)   Resp 16   Ht 1.65 m (5' 4.96\")   Wt 79 kg (174 lb 2.6 oz)   SpO2 94%   BMI 29.02 kg/m²      INTAKE/OUTPUT:      Intake/Output Summary (Last 24 hours) at 7/10/2025 0816  Last data filed at 7/10/2025 0700  Gross per 24 hour   Intake --   Output 100 ml   Net -100 ml       PHYSICAL EXAM:  General Appearance: awake, alert, oriented, in no acute distress  HEENT:  Normocephalic, atraumatic, mucus membranes moist    NGT with enteric output  Heart: Heart regular rate and rhythm  Lungs: normal WOB on room air  Abdomen:soft, mildly tender in mid abdomen, large palpable firm, irregular, fixed mass in left upper quadrant   Incision: none   Extremities: No cyanosis, pitting edema, rashes noted.    Skin: Skin color, texture, turgor normal. No rashes or lesions.      Data:  CBC with Differential:    Lab Results   Component Value Date/Time    WBC 4.7 07/10/2025 06:46 AM    RBC 3.18 07/10/2025 06:46 AM    RBC 3.86 04/29/2024 08:46 AM    HGB 9.2 07/10/2025 06:46 AM    HCT 28.4 07/10/2025 06:46 AM     07/10/2025 06:46 AM    MCV 89.2 07/10/2025 06:46 AM    MCH 28.8 07/10/2025 06:46 AM    MCHC 32.3 07/10/2025 06:46 AM    RDW 15.9 07/10/2025 06:46 AM    NRBC 0.00 06/03/2025 12:32 PM    LYMPHOPCT 18 07/10/2025 06:46 AM    LYMPHOPCT 13.5 02/08/2022 01:58 PM    MONOPCT 11 07/10/2025 06:46 AM    EOSPCT 4 07/10/2025 06:46 AM    BASOPCT 0 07/10/2025 06:46 AM    MONOSABS 0.52 07/10/2025 06:46 AM    LYMPHSABS 0.85 07/10/2025 06:46 AM    EOSABS 0.19 07/10/2025 06:46 AM

## 2025-07-10 NOTE — PROGRESS NOTES
Occupational Therapy    [facility]  Occupational Therapy Not Seen Note    DATE: 7/10/2025    NAME: Angelica Guzman  MRN: [id number]   : 1950      Patient not seen this date for Occupational Therapy due to:    Patient independent with ADLs and functional tasks with no acute OT needs. Will defer OT evaluation at this time. Please reorder OT if future needs arise.     Electronically signed by Bart Fregoso OT on 7/10/2025 at 10:00 AM

## 2025-07-10 NOTE — PROGRESS NOTES
Physical Therapy        Physical Therapy Cancel Note      DATE: 7/10/2025    NAME: Angelica Guzman  MRN: [id number]   : 1950      Patient not seen this date for Physical Therapy due to:    Patient Declined: Pt decline need acute PT & OT needs. Reports has been up walking to bathroom and no difficulty except for multiple lines (NG tube,  IV.) Pt states lives w/ son and rails for home entry steps and mowed her lawn last week. Pt repeat decline need for acute therapy services. RN & OT notified.   PLAN: discontinue PT evaluation per pt request.      Electronically signed by Gina Guerrero PT on 7/10/2025 at 8:54 AM

## 2025-07-10 NOTE — CARE COORDINATION
Case Management Assessment  Initial Evaluation    Date/Time of Evaluation: 7/10/2025 12:41 PM  Assessment Completed by: Tressa Richardson RN    If patient is discharged prior to next notation, then this note serves as note for discharge by case management.    Patient Name: Angelica Guzman                   YOB: 1950  Diagnosis: Small bowel obstruction (HCC) [K56.609]                   Date / Time: 7/9/2025 10:01 AM    Patient Admission Status: Inpatient   Readmission Risk (Low < 19, Mod (19-27), High > 27): Readmission Risk Score: 15.5    Current PCP: Catarino Luciano MD  PCP verified by CM? Yes    Chart Reviewed: Yes      History Provided by: Patient  Patient Orientation: Alert and Oriented    Patient Cognition: Alert    Hospitalization in the last 30 days (Readmission):  No    If yes, Readmission Assessment in  Navigator will be completed.    Advance Directives:      Code Status: Full Code   Patient's Primary Decision Maker is: Legal Next of Kin      Discharge Planning:    Patient lives with: Children, Family Members Type of Home: House  Primary Care Giver: Self  Patient Support Systems include: Children, Family Members   Current Financial resources: Medicare  Current community resources: None  Current services prior to admission: C-pap            Current DME:              Type of Home Care services:  None    ADLS  Prior functional level: Independent in ADLs/IADLs  Current functional level: Independent in ADLs/IADLs    PT AM-PAC:   /24  OT AM-PAC:   /24    Family can provide assistance at DC: Yes  Would you like Case Management to discuss the discharge plan with any other family members/significant others, and if so, who? No  Plans to Return to Present Housing: Yes  Other Identified Issues/Barriers to RETURNING to current housing: clinical status  Potential Assistance needed at discharge: N/A            Potential DME:    Patient expects to discharge to: House  Plan for transportation at discharge:

## 2025-07-11 LAB
ANION GAP SERPL CALCULATED.3IONS-SCNC: 12 MMOL/L (ref 9–16)
BACTERIA URNS QL MICRO: ABNORMAL
BASOPHILS # BLD: 0 K/UL (ref 0–0.2)
BASOPHILS NFR BLD: 0 % (ref 0–2)
BILIRUB UR QL STRIP: NEGATIVE
BUN SERPL-MCNC: 9 MG/DL (ref 8–23)
CA-I BLD-SCNC: 1.13 MMOL/L (ref 1.13–1.33)
CALCIUM SERPL-MCNC: 7.5 MG/DL (ref 8.6–10.4)
CHLORIDE SERPL-SCNC: 109 MMOL/L (ref 98–107)
CLARITY UR: CLEAR
CO2 SERPL-SCNC: 22 MMOL/L (ref 20–31)
COLOR UR: YELLOW
CREAT SERPL-MCNC: 0.5 MG/DL (ref 0.6–0.9)
EOSINOPHIL # BLD: 0.2 K/UL (ref 0–0.4)
EOSINOPHILS RELATIVE PERCENT: 3 % (ref 1–4)
EPI CELLS #/AREA URNS HPF: ABNORMAL /HPF (ref 0–5)
ERYTHROCYTE [DISTWIDTH] IN BLOOD BY AUTOMATED COUNT: 15.9 % (ref 12.5–15.4)
GFR, ESTIMATED: >90 ML/MIN/1.73M2
GLUCOSE BLD-MCNC: 105 MG/DL (ref 65–105)
GLUCOSE BLD-MCNC: 116 MG/DL (ref 65–105)
GLUCOSE BLD-MCNC: 63 MG/DL (ref 65–105)
GLUCOSE BLD-MCNC: 66 MG/DL (ref 65–105)
GLUCOSE BLD-MCNC: 99 MG/DL (ref 65–105)
GLUCOSE SERPL-MCNC: 103 MG/DL (ref 74–99)
GLUCOSE UR STRIP-MCNC: ABNORMAL MG/DL
HCT VFR BLD AUTO: 28.4 % (ref 36–46)
HGB BLD-MCNC: 9.3 G/DL (ref 12–16)
HGB UR QL STRIP.AUTO: ABNORMAL
KETONES UR STRIP-MCNC: ABNORMAL MG/DL
LEUKOCYTE ESTERASE UR QL STRIP: ABNORMAL
LYMPHOCYTES NFR BLD: 0.7 K/UL (ref 1–4.8)
LYMPHOCYTES RELATIVE PERCENT: 10 % (ref 24–44)
MAGNESIUM SERPL-MCNC: 1.9 MG/DL (ref 1.6–2.4)
MCH RBC QN AUTO: 29.2 PG (ref 26–34)
MCHC RBC AUTO-ENTMCNC: 32.7 G/DL (ref 31–37)
MCV RBC AUTO: 89.4 FL (ref 80–100)
MONOCYTES NFR BLD: 0.6 K/UL (ref 0.1–1.2)
MONOCYTES NFR BLD: 8 % (ref 2–11)
MUCOUS THREADS URNS QL MICRO: ABNORMAL
NEUTROPHILS NFR BLD: 79 % (ref 36–66)
NEUTS SEG NFR BLD: 5.6 K/UL (ref 1.8–7.7)
NITRITE UR QL STRIP: NEGATIVE
PH UR STRIP: 6 [PH] (ref 5–8)
PLATELET # BLD AUTO: 246 K/UL (ref 140–450)
PMV BLD AUTO: 6 FL (ref 6–12)
POTASSIUM SERPL-SCNC: 3.2 MMOL/L (ref 3.7–5.3)
PROT UR STRIP-MCNC: NEGATIVE MG/DL
RBC # BLD AUTO: 3.17 M/UL (ref 4–5.2)
RBC #/AREA URNS HPF: ABNORMAL /HPF (ref 0–2)
SODIUM SERPL-SCNC: 143 MMOL/L (ref 136–145)
SP GR UR STRIP: 1.02 (ref 1–1.03)
UROBILINOGEN UR STRIP-ACNC: NORMAL EU/DL (ref 0–1)
WBC #/AREA URNS HPF: ABNORMAL /HPF (ref 0–5)
WBC OTHER # BLD: 7.2 K/UL (ref 3.5–11)

## 2025-07-11 PROCEDURE — 80048 BASIC METABOLIC PNL TOTAL CA: CPT

## 2025-07-11 PROCEDURE — 36415 COLL VENOUS BLD VENIPUNCTURE: CPT

## 2025-07-11 PROCEDURE — 99232 SBSQ HOSP IP/OBS MODERATE 35: CPT | Performed by: FAMILY MEDICINE

## 2025-07-11 PROCEDURE — 2580000003 HC RX 258: Performed by: STUDENT IN AN ORGANIZED HEALTH CARE EDUCATION/TRAINING PROGRAM

## 2025-07-11 PROCEDURE — 6360000002 HC RX W HCPCS: Performed by: STUDENT IN AN ORGANIZED HEALTH CARE EDUCATION/TRAINING PROGRAM

## 2025-07-11 PROCEDURE — 82330 ASSAY OF CALCIUM: CPT

## 2025-07-11 PROCEDURE — 6360000002 HC RX W HCPCS

## 2025-07-11 PROCEDURE — 83735 ASSAY OF MAGNESIUM: CPT

## 2025-07-11 PROCEDURE — 2580000003 HC RX 258: Performed by: FAMILY MEDICINE

## 2025-07-11 PROCEDURE — 2500000003 HC RX 250 WO HCPCS

## 2025-07-11 PROCEDURE — 2580000003 HC RX 258

## 2025-07-11 PROCEDURE — 85025 COMPLETE CBC W/AUTO DIFF WBC: CPT

## 2025-07-11 PROCEDURE — 82947 ASSAY GLUCOSE BLOOD QUANT: CPT

## 2025-07-11 PROCEDURE — 1210000000 HC MED SURG R&B

## 2025-07-11 RX ORDER — MAGNESIUM SULFATE IN WATER 40 MG/ML
2000 INJECTION, SOLUTION INTRAVENOUS ONCE
Status: COMPLETED | OUTPATIENT
Start: 2025-07-11 | End: 2025-07-11

## 2025-07-11 RX ORDER — CALCIUM GLUCONATE 20 MG/ML
1000 INJECTION, SOLUTION INTRAVENOUS ONCE
Status: COMPLETED | OUTPATIENT
Start: 2025-07-11 | End: 2025-07-11

## 2025-07-11 RX ORDER — POTASSIUM CHLORIDE 7.45 MG/ML
10 INJECTION INTRAVENOUS
Status: DISCONTINUED | OUTPATIENT
Start: 2025-07-11 | End: 2025-07-11

## 2025-07-11 RX ORDER — DEXTROSE MONOHYDRATE AND SODIUM CHLORIDE 5; .9 G/100ML; G/100ML
INJECTION, SOLUTION INTRAVENOUS CONTINUOUS
Status: DISCONTINUED | OUTPATIENT
Start: 2025-07-11 | End: 2025-07-13 | Stop reason: HOSPADM

## 2025-07-11 RX ORDER — POTASSIUM CHLORIDE 7.45 MG/ML
10 INJECTION INTRAVENOUS
Status: DISPENSED | OUTPATIENT
Start: 2025-07-11 | End: 2025-07-11

## 2025-07-11 RX ADMIN — HYDROMORPHONE HYDROCHLORIDE 0.5 MG: 1 INJECTION, SOLUTION INTRAMUSCULAR; INTRAVENOUS; SUBCUTANEOUS at 10:00

## 2025-07-11 RX ADMIN — POTASSIUM CHLORIDE 10 MEQ: 7.46 INJECTION, SOLUTION INTRAVENOUS at 13:25

## 2025-07-11 RX ADMIN — HYDROMORPHONE HYDROCHLORIDE 0.5 MG: 1 INJECTION, SOLUTION INTRAMUSCULAR; INTRAVENOUS; SUBCUTANEOUS at 14:56

## 2025-07-11 RX ADMIN — DEXTROSE 125 ML: 10 SOLUTION INTRAVENOUS at 06:31

## 2025-07-11 RX ADMIN — DEXTROSE AND SODIUM CHLORIDE: 5; 900 INJECTION, SOLUTION INTRAVENOUS at 11:46

## 2025-07-11 RX ADMIN — SODIUM CHLORIDE: 0.9 INJECTION, SOLUTION INTRAVENOUS at 09:59

## 2025-07-11 RX ADMIN — MAGNESIUM SULFATE HEPTAHYDRATE 2000 MG: 40 INJECTION, SOLUTION INTRAVENOUS at 18:16

## 2025-07-11 RX ADMIN — POTASSIUM CHLORIDE 10 MEQ: 7.46 INJECTION, SOLUTION INTRAVENOUS at 14:58

## 2025-07-11 RX ADMIN — SODIUM CHLORIDE, PRESERVATIVE FREE 10 ML: 5 INJECTION INTRAVENOUS at 20:27

## 2025-07-11 RX ADMIN — KETOROLAC TROMETHAMINE 15 MG: 15 INJECTION, SOLUTION INTRAMUSCULAR; INTRAVENOUS at 08:06

## 2025-07-11 RX ADMIN — CALCIUM GLUCONATE 1000 MG: 20 INJECTION, SOLUTION INTRAVENOUS at 10:03

## 2025-07-11 RX ADMIN — POTASSIUM CHLORIDE 10 MEQ: 7.46 INJECTION, SOLUTION INTRAVENOUS at 11:19

## 2025-07-11 RX ADMIN — HYDROMORPHONE HYDROCHLORIDE 0.25 MG: 1 INJECTION, SOLUTION INTRAMUSCULAR; INTRAVENOUS; SUBCUTANEOUS at 20:27

## 2025-07-11 RX ADMIN — SODIUM CHLORIDE, PRESERVATIVE FREE 10 ML: 5 INJECTION INTRAVENOUS at 08:07

## 2025-07-11 ASSESSMENT — PAIN - FUNCTIONAL ASSESSMENT
PAIN_FUNCTIONAL_ASSESSMENT: PREVENTS OR INTERFERES SOME ACTIVE ACTIVITIES AND ADLS
PAIN_FUNCTIONAL_ASSESSMENT: PREVENTS OR INTERFERES SOME ACTIVE ACTIVITIES AND ADLS
PAIN_FUNCTIONAL_ASSESSMENT: ACTIVITIES ARE NOT PREVENTED
PAIN_FUNCTIONAL_ASSESSMENT: PREVENTS OR INTERFERES SOME ACTIVE ACTIVITIES AND ADLS

## 2025-07-11 ASSESSMENT — PAIN DESCRIPTION - ORIENTATION
ORIENTATION: LEFT;MID
ORIENTATION: RIGHT;LEFT;MID
ORIENTATION: RIGHT;LEFT;LOWER
ORIENTATION: RIGHT;LEFT;LOWER

## 2025-07-11 ASSESSMENT — PAIN DESCRIPTION - PAIN TYPE: TYPE: ACUTE PAIN

## 2025-07-11 ASSESSMENT — PAIN DESCRIPTION - LOCATION
LOCATION: ABDOMEN

## 2025-07-11 ASSESSMENT — PAIN SCALES - GENERAL
PAINLEVEL_OUTOF10: 3
PAINLEVEL_OUTOF10: 4
PAINLEVEL_OUTOF10: 6
PAINLEVEL_OUTOF10: 1
PAINLEVEL_OUTOF10: 7
PAINLEVEL_OUTOF10: 5

## 2025-07-11 ASSESSMENT — PAIN DESCRIPTION - DESCRIPTORS
DESCRIPTORS: CRAMPING
DESCRIPTORS: ACHING;CRAMPING

## 2025-07-11 NOTE — PROGRESS NOTES
[facility] Residency  Inpatient Service     Progress Note  2025    7:33 AM    Name:   Angelica Guzman  MRN:     [id number]     Acct:      [id number]   Room:   321/[facility]-01   Day:  2  Admit Date:  2025 10:01 AM    PCP:   Catarino Luciano MD  Code Status:  Full Code    Subjective:     Overnight events: Patient complained of painful urination. UA was ordered. Unremarkable for UTI.    Pt was examined at bedside this morning. She has ongoing abdominal pain, unchanged from yesterday. She has passed flatus but has not yet had a bowel movement. She denies chest pain, shortness of breath, fever, and chills. She has no other complaints this morning.     Medications:     Allergies:    Allergies   Allergen Reactions    Morphine     Atorvastatin        Current Meds:   Scheduled Meds:    sodium chloride  80 mL IntraVENous Once    [Held by provider] FLUoxetine  40 mg Oral Daily    [Held by provider] imatinib  300 mg Oral BID    [Held by provider] rivaroxaban  10 mg Oral Daily with breakfast    sodium chloride flush  5-40 mL IntraVENous 2 times per day    insulin lispro  0-4 Units SubCUTAneous 4x Daily AC & HS     Continuous Infusions:    sodium chloride      sodium chloride 50 mL/hr at 07/10/25 1847    dextrose       PRN Meds: sodium chloride flush, sodium chloride flush, sodium chloride, potassium chloride **OR** potassium alternative oral replacement **OR** potassium chloride, magnesium sulfate, ondansetron **OR** ondansetron, polyethylene glycol, acetaminophen **OR** acetaminophen, glucose, dextrose bolus **OR** dextrose bolus, glucagon (rDNA), dextrose, ketorolac, HYDROmorphone **OR** HYDROmorphone, phenol    ROS:   ROS is negative except for points noted above.    Data:     Vitals:  /64   Pulse 72   Temp 99.1 °F (37.3 °C) (Oral)   Resp 12   Ht 1.65 m (5' 4.96\")   Wt 79 kg (174 lb 2.6 oz)   SpO2 97%   BMI 29.02 kg/m²   Temp (24hrs), Av.7 °F (37.1

## 2025-07-11 NOTE — CARE COORDINATION
[facility] Quality Flow/Interdisciplinary Rounds Progress Note    Quality Flow Rounds held on July 11, 2025 at 0930    Disciplines Attending:  Bedside Nurse, , and Nursing Unit Leadership    Barriers to Discharge: clinical status    Anticipated Discharge Date:   7/12/25    Anticipated Discharge Disposition: Home     [facility] RISK OF UNPLANNED READMISSION 2.0             16.2 Total Score        Discussed patient goal for the day, patient clinical progression, and barriers to discharge.  Plan for NG clamp trial today then advance to [facility]. Goal to return home independent at discharge.      Tressa Richardson RN  July 11, 2025

## 2025-07-11 NOTE — PROGRESS NOTES
[facility] Hematology and Oncology - Daily Progress Note  2025, 8:08 AM    Admit Date: 2025  PCP: Catarino Luciano MD    Impression/Plan:   Metastatic GIST  -Patient of Dr. Robles, diagnosed with metastatic, unresectable GIST tumor in 2022  -With known liver, lymph node, lung, and peritoneal metastases  -On Imatinib 300mg PO BID--> on hold this admission. Can discuss resuming at outpatient follow up  -No other inpatient plans  -Follow up with Dr. Robles after discharge     Hx Breast cancer  -Diagnosed with triple negative breast cancer in , s/p R mastectomy f/b adjuvant chemotherapy through 10/2019  -MORENO since  -No inpatient plans  -Follow up with Dr. Robles     Anemia  -Likely secondary to effects of Imatinib. Cannot exclude other etiology at this time  -Hgb=9.3(9.2) normocytic  -B12 and folate levels are adequate  -Transfuse for hgb<7     SBO  -CT a/p showed Small-bowel obstruction with abrupt caliber change at the level of the  unchanged 19.7 cm left abdominal mass  -NGT, NPO, Bowel rest  -No surgical plans   -Nutritional services consult  -General surgery following     Continue management of other medical problems as per appropriate services.      Patient discussed with Dr. Robles. Please call with questions.        Subjective/Interval History:   Patient seen and examined at bedside, currently at EOB, just finished showering. NGT is clamped. Patient reports she is passing gas, has not yet had a BM. She reports pain is well controlled. Denies any questions at this time. Patient is afebrile. VSS. Maintained on RA. No distress noted   Physical Examination :   Patient Vitals for the past 96 hrs (Last 3 readings):   Weight   25 1006 79 kg (174 lb 2.6 oz)      Temperature Range: Temp: 99.1 °F (37.3 °C) Temp  Av.6 °F (37 °C)  Min: 98.2 °F (36.8 °C)  Max: 99.1 °F (37.3 °C)  Weight change:     Physical Exam   Vitals and nursing note reviewed.   Constitutional:       General: She

## 2025-07-11 NOTE — PROGRESS NOTES
General Surgery:  Daily Progress Note                    PATIENT NAME: Angelica Guzman     TODAY'S DATE: 7/11/2025, 7:20 AM  CC:  I feel a little better    SUBJECTIVE:     Patient is seen and examined this morning, no acute event overnight. Patient reports no nausea, continued passing gas, no bowel movement since ADM, but continue passing gas. NG tube de-clogged at bedside. NG output 500 total for last 24 hrs. All questions and concerns were addressed with the patient.     OBJECTIVE:   VITALS:  BP (!) 140/61   Pulse 80   Temp 98.6 °F (37 °C) (Oral)   Resp 16   Ht 1.65 m (5' 4.96\")   Wt 79 kg (174 lb 2.6 oz)   SpO2 97%   BMI 29.02 kg/m²      INTAKE/OUTPUT:      Intake/Output Summary (Last 24 hours) at 7/11/2025 0720  Last data filed at 7/10/2025 2325  Gross per 24 hour   Intake 3550.76 ml   Output 750 ml   Net 2800.76 ml       PHYSICAL EXAM:  General Appearance: awake, alert, oriented, in no acute distress, AOx4  HEENT:  Normocephalic, atraumatic, mucus membranes moist    NGT with enteric output  Heart: Heart regular rate and rhythm  Lungs: normal effort, no accessory muscle use, on room air  Abdomen:soft, mildly tender in mid abdomen, large palpable firm, irregular, fixed mass in left upper quadrant   Incision: none   Extremities: No cyanosis, pitting edema, rashes noted.    Skin: Skin color, texture, turgor normal. No rashes or lesions.      Data:  CBC with Differential:    Lab Results   Component Value Date/Time    WBC 4.7 07/10/2025 06:46 AM    RBC 3.18 07/10/2025 06:46 AM    RBC 3.86 04/29/2024 08:46 AM    HGB 9.2 07/10/2025 06:46 AM    HCT 28.4 07/10/2025 06:46 AM     07/10/2025 06:46 AM    MCV 89.2 07/10/2025 06:46 AM    MCH 28.8 07/10/2025 06:46 AM    MCHC 32.3 07/10/2025 06:46 AM    RDW 15.9 07/10/2025 06:46 AM    NRBC 0.00 06/03/2025 12:32 PM    LYMPHOPCT 18 07/10/2025 06:46 AM    LYMPHOPCT 13.5 02/08/2022 01:58 PM    MONOPCT 11 07/10/2025 06:46 AM    EOSPCT 4 07/10/2025 06:46 AM

## 2025-07-11 NOTE — PROGRESS NOTES
Spiritual Health History and Assessment/Progress Note  [facility]    (P) Spiritual/Emotional Needs,  ,  ,      Name: Angelica Guzman MRN: [id number]    Age: 74 y.o.     Sex: female   Language: English   Nondenominational: Other   Small bowel obstruction (HCC)     Date: 7/11/2025            Total Time Calculated: (P) 20 min              Spiritual Assessment began in [facility]        Referral/Consult From: (P) Rounding   Encounter Overview/Reason: (P) Spiritual/Emotional Needs  Service Provided For: (P) Patient    Writer met w/ pt in pt's room. Pt shared about her current and ongoing medical challenges. Pt said she was feeling good at the beginning of the week and is experiencing \" a lot of emotions\" since being in the hospital. Pt processed these feelings as  provided a listening presence. Pt also spoke about her family and grandchildren and spoke about how meaninful family relationsips are to her. Pt also shared about her [employer]ars[employer] skye, and her prayer practices. Pt said she is part of a parish Hindu and also attends [employer] with her friend. Writer provided ministry presence, empathic listening, and prayed with and for pt.     Skye, Belief, Meaning:   Patient identifies as spiritual and is connected with a skye tradition or spiritual practice  Family/Friends No family/friends present      Importance and Influence:  Patient has spiritual/personal beliefs that influence decisions regarding their health  Family/Friends No family/friends present    Community:  Patient feels well-supported. Support system includes: Children and Extended family  Family/Friends No family/friends present    Assessment and Plan of Care:     Patient Interventions include: Facilitated expression of thoughts and feelings, Explored spiritual coping/struggle/distress, Engaged in theological reflection, and Affirmed coping skills/support systems  Family/Friends Interventions include: No family/friends

## 2025-07-12 LAB
ANION GAP SERPL CALCULATED.3IONS-SCNC: 8 MMOL/L (ref 9–16)
BASOPHILS # BLD: 0 K/UL (ref 0–0.2)
BASOPHILS NFR BLD: 0 % (ref 0–2)
BUN SERPL-MCNC: 5 MG/DL (ref 8–23)
CALCIUM SERPL-MCNC: 7.8 MG/DL (ref 8.6–10.4)
CHLORIDE SERPL-SCNC: 110 MMOL/L (ref 98–107)
CO2 SERPL-SCNC: 25 MMOL/L (ref 20–31)
CREAT SERPL-MCNC: 0.5 MG/DL (ref 0.6–0.9)
EOSINOPHIL # BLD: 0.3 K/UL (ref 0–0.4)
EOSINOPHILS RELATIVE PERCENT: 5 % (ref 1–4)
ERYTHROCYTE [DISTWIDTH] IN BLOOD BY AUTOMATED COUNT: 15.7 % (ref 12.5–15.4)
GFR, ESTIMATED: >90 ML/MIN/1.73M2
GLUCOSE BLD-MCNC: 113 MG/DL (ref 65–105)
GLUCOSE BLD-MCNC: 144 MG/DL (ref 65–105)
GLUCOSE BLD-MCNC: 161 MG/DL (ref 65–105)
GLUCOSE BLD-MCNC: 99 MG/DL (ref 65–105)
GLUCOSE SERPL-MCNC: 108 MG/DL (ref 74–99)
HCT VFR BLD AUTO: 28.8 % (ref 36–46)
HGB BLD-MCNC: 9.5 G/DL (ref 12–16)
LYMPHOCYTES NFR BLD: 0.8 K/UL (ref 1–4.8)
LYMPHOCYTES RELATIVE PERCENT: 13 % (ref 24–44)
MCH RBC QN AUTO: 29.2 PG (ref 26–34)
MCHC RBC AUTO-ENTMCNC: 32.9 G/DL (ref 31–37)
MCV RBC AUTO: 88.6 FL (ref 80–100)
MICROORGANISM SPEC CULT: NORMAL
MONOCYTES NFR BLD: 0.7 K/UL (ref 0.1–1.2)
MONOCYTES NFR BLD: 11 % (ref 2–11)
NEUTROPHILS NFR BLD: 71 % (ref 36–66)
NEUTS SEG NFR BLD: 4.2 K/UL (ref 1.8–7.7)
PLATELET # BLD AUTO: 225 K/UL (ref 140–450)
PMV BLD AUTO: 6 FL (ref 6–12)
POTASSIUM SERPL-SCNC: 3.6 MMOL/L (ref 3.7–5.3)
RBC # BLD AUTO: 3.25 M/UL (ref 4–5.2)
SERVICE CMNT-IMP: NORMAL
SODIUM SERPL-SCNC: 143 MMOL/L (ref 136–145)
SPECIMEN DESCRIPTION: NORMAL
WBC OTHER # BLD: 5.9 K/UL (ref 3.5–11)

## 2025-07-12 PROCEDURE — 82947 ASSAY GLUCOSE BLOOD QUANT: CPT

## 2025-07-12 PROCEDURE — 6360000002 HC RX W HCPCS: Performed by: STUDENT IN AN ORGANIZED HEALTH CARE EDUCATION/TRAINING PROGRAM

## 2025-07-12 PROCEDURE — 1210000000 HC MED SURG R&B

## 2025-07-12 PROCEDURE — 99232 SBSQ HOSP IP/OBS MODERATE 35: CPT | Performed by: FAMILY MEDICINE

## 2025-07-12 PROCEDURE — 85025 COMPLETE CBC W/AUTO DIFF WBC: CPT

## 2025-07-12 PROCEDURE — 80048 BASIC METABOLIC PNL TOTAL CA: CPT

## 2025-07-12 PROCEDURE — 36415 COLL VENOUS BLD VENIPUNCTURE: CPT

## 2025-07-12 PROCEDURE — 2580000003 HC RX 258: Performed by: STUDENT IN AN ORGANIZED HEALTH CARE EDUCATION/TRAINING PROGRAM

## 2025-07-12 RX ADMIN — DEXTROSE AND SODIUM CHLORIDE: 5; 900 INJECTION, SOLUTION INTRAVENOUS at 05:43

## 2025-07-12 RX ADMIN — HYDROMORPHONE HYDROCHLORIDE 0.25 MG: 1 INJECTION, SOLUTION INTRAMUSCULAR; INTRAVENOUS; SUBCUTANEOUS at 10:34

## 2025-07-12 RX ADMIN — HYDROMORPHONE HYDROCHLORIDE 0.25 MG: 1 INJECTION, SOLUTION INTRAMUSCULAR; INTRAVENOUS; SUBCUTANEOUS at 05:40

## 2025-07-12 RX ADMIN — KETOROLAC TROMETHAMINE 15 MG: 15 INJECTION, SOLUTION INTRAMUSCULAR; INTRAVENOUS at 01:39

## 2025-07-12 RX ADMIN — HYDROMORPHONE HYDROCHLORIDE 0.25 MG: 1 INJECTION, SOLUTION INTRAMUSCULAR; INTRAVENOUS; SUBCUTANEOUS at 20:01

## 2025-07-12 ASSESSMENT — PAIN SCALES - GENERAL
PAINLEVEL_OUTOF10: 6
PAINLEVEL_OUTOF10: 8
PAINLEVEL_OUTOF10: 5

## 2025-07-12 ASSESSMENT — PAIN DESCRIPTION - LOCATION
LOCATION: HEAD;NECK
LOCATION: ABDOMEN
LOCATION: ABDOMEN

## 2025-07-12 ASSESSMENT — PAIN DESCRIPTION - DESCRIPTORS: DESCRIPTORS: ACHING

## 2025-07-12 NOTE — PROGRESS NOTES
[facility]  Inpatient Service     Progress Note  7/12/2025    7:46 AM    Name:   Angelica Guzman  MRN:     [id number]     Acct:      [id number]   Room:   [facility]/[address] [place] [address] Day:  3  Admit Date:  7/9/2025 10:01 AM    PCP:   Catarino Luciano MD  Code Status:  Full Code    Subjective:     Overnight events: Patient's NG tube was clamped yesterday. She was given small amounts of clear liquids and jello and tolerated it well.     Pt was examined at bedside this morning. She states that her abdominal pain has improved slightly compared to yesterday. She continues to pass flatus but denies having a bowel movement. She states she has been walking to try to stimulate her bowels as well. She has no other complaints this morning.     Medications:     Allergies:    Allergies   Allergen Reactions    Morphine     Atorvastatin        Current Meds:   Scheduled Meds:    sodium chloride  80 mL IntraVENous Once    [Held by provider] FLUoxetine  40 mg Oral Daily    [Held by provider] imatinib  300 mg Oral BID    [Held by provider] rivaroxaban  10 mg Oral Daily with breakfast    sodium chloride flush  5-40 mL IntraVENous 2 times per day    insulin lispro  0-4 Units SubCUTAneous 4x Daily AC & HS     Continuous Infusions:    dextrose 5 % and 0.9 % NaCl 50 mL/hr at 07/12/25 0543    sodium chloride      dextrose       PRN Meds: sodium chloride flush, sodium chloride flush, sodium chloride, potassium chloride **OR** potassium alternative oral replacement **OR** potassium chloride, magnesium sulfate, ondansetron **OR** ondansetron, polyethylene glycol, acetaminophen **OR** acetaminophen, glucose, dextrose bolus **OR** dextrose bolus, glucagon (rDNA), dextrose, ketorolac, HYDROmorphone **OR** HYDROmorphone, phenol    ROS:   ROS is negative except for points noted above.    Data:     Vitals:  BP (!) 147/64   Pulse 64   Temp 98.4 °F (36.9 °C) (Oral)   Resp 16   Ht 1.65 m (5' 4.96\")

## 2025-07-12 NOTE — PROGRESS NOTES
General Surgery:  Daily Progress Note                    PATIENT NAME: Angelica Guzman     TODAY'S DATE: 7/12/2025, 12:05 PM  CC: Feeling OK    SUBJECTIVE:     Patient is seen and examined this morning, no acute event overnight.  States she is doing okay.  Denies nausea or emesis.  NG tube is been clamped since yesterday afternoon.  Tolerating clear liquid diet without any increased pain or nausea.  Continues to pass flatus however no BM.    OBJECTIVE:   VITALS:  BP (!) 157/66   Pulse 70   Temp 98.1 °F (36.7 °C) (Oral)   Resp 16   Ht 1.65 m (5' 4.96\")   Wt 79 kg (174 lb 2.6 oz)   SpO2 100%   BMI 29.02 kg/m²      INTAKE/OUTPUT:      Intake/Output Summary (Last 24 hours) at 7/12/2025 1205  Last data filed at 7/11/2025 1902  Gross per 24 hour   Intake 540 ml   Output --   Net 540 ml       PHYSICAL EXAM:  General Appearance: awake, alert, oriented, in no acute distress, AOx4  HEENT:  Normocephalic, atraumatic, mucus membranes moist    NGT with enteric output  Heart: Heart regular rate and rhythm  Lungs: normal effort, no accessory muscle use, on room air  Abdomen:soft, mildly tender in mid abdomen, large palpable firm, irregular, fixed mass in left upper quadrant   Incision: none   Extremities: No cyanosis, pitting edema, rashes noted.    Skin: Skin color, texture, turgor normal. No rashes or lesions.      Data:  CBC with Differential:    Lab Results   Component Value Date/Time    WBC 5.9 07/12/2025 06:24 AM    RBC 3.25 07/12/2025 06:24 AM    RBC 3.86 04/29/2024 08:46 AM    HGB 9.5 07/12/2025 06:24 AM    HCT 28.8 07/12/2025 06:24 AM     07/12/2025 06:24 AM    MCV 88.6 07/12/2025 06:24 AM    MCH 29.2 07/12/2025 06:24 AM    MCHC 32.9 07/12/2025 06:24 AM    RDW 15.7 07/12/2025 06:24 AM    NRBC 0.00 06/03/2025 12:32 PM    LYMPHOPCT 13 07/12/2025 06:24 AM    LYMPHOPCT 13.5 02/08/2022 01:58 PM    MONOPCT 11 07/12/2025 06:24 AM    EOSPCT 5 07/12/2025 06:24 AM    BASOPCT 0 07/12/2025 06:24 AM    MONOSABS 0.70

## 2025-07-13 VITALS
SYSTOLIC BLOOD PRESSURE: 144 MMHG | HEIGHT: 65 IN | DIASTOLIC BLOOD PRESSURE: 64 MMHG | WEIGHT: 174.16 LBS | RESPIRATION RATE: 18 BRPM | TEMPERATURE: 98.2 F | BODY MASS INDEX: 29.02 KG/M2 | OXYGEN SATURATION: 100 % | HEART RATE: 64 BPM

## 2025-07-13 LAB
ANION GAP SERPL CALCULATED.3IONS-SCNC: 7 MMOL/L (ref 9–16)
BASOPHILS # BLD: 0 K/UL (ref 0–0.2)
BASOPHILS NFR BLD: 1 % (ref 0–2)
BUN SERPL-MCNC: 3 MG/DL (ref 8–23)
CALCIUM SERPL-MCNC: 8 MG/DL (ref 8.6–10.4)
CHLORIDE SERPL-SCNC: 111 MMOL/L (ref 98–107)
CO2 SERPL-SCNC: 26 MMOL/L (ref 20–31)
CREAT SERPL-MCNC: 0.5 MG/DL (ref 0.6–0.9)
EOSINOPHIL # BLD: 0.3 K/UL (ref 0–0.4)
EOSINOPHILS RELATIVE PERCENT: 5 % (ref 1–4)
ERYTHROCYTE [DISTWIDTH] IN BLOOD BY AUTOMATED COUNT: 15.8 % (ref 12.5–15.4)
GFR, ESTIMATED: >90 ML/MIN/1.73M2
GLUCOSE BLD-MCNC: 108 MG/DL (ref 65–105)
GLUCOSE BLD-MCNC: 95 MG/DL (ref 65–105)
GLUCOSE SERPL-MCNC: 112 MG/DL (ref 74–99)
HCT VFR BLD AUTO: 28.1 % (ref 36–46)
HGB BLD-MCNC: 9.2 G/DL (ref 12–16)
LYMPHOCYTES NFR BLD: 1 K/UL (ref 1–4.8)
LYMPHOCYTES RELATIVE PERCENT: 19 % (ref 24–44)
MCH RBC QN AUTO: 29.1 PG (ref 26–34)
MCHC RBC AUTO-ENTMCNC: 32.9 G/DL (ref 31–37)
MCV RBC AUTO: 88.5 FL (ref 80–100)
MONOCYTES NFR BLD: 0.6 K/UL (ref 0.1–1.2)
MONOCYTES NFR BLD: 12 % (ref 2–11)
NEUTROPHILS NFR BLD: 63 % (ref 36–66)
NEUTS SEG NFR BLD: 3.5 K/UL (ref 1.8–7.7)
PLATELET # BLD AUTO: 238 K/UL (ref 140–450)
PMV BLD AUTO: 6.2 FL (ref 6–12)
POTASSIUM SERPL-SCNC: 3.6 MMOL/L (ref 3.7–5.3)
RBC # BLD AUTO: 3.17 M/UL (ref 4–5.2)
SODIUM SERPL-SCNC: 144 MMOL/L (ref 136–145)
WBC OTHER # BLD: 5.4 K/UL (ref 3.5–11)

## 2025-07-13 PROCEDURE — 6360000002 HC RX W HCPCS: Performed by: STUDENT IN AN ORGANIZED HEALTH CARE EDUCATION/TRAINING PROGRAM

## 2025-07-13 PROCEDURE — 99232 SBSQ HOSP IP/OBS MODERATE 35: CPT | Performed by: FAMILY MEDICINE

## 2025-07-13 PROCEDURE — 82947 ASSAY GLUCOSE BLOOD QUANT: CPT

## 2025-07-13 PROCEDURE — 6370000000 HC RX 637 (ALT 250 FOR IP): Performed by: STUDENT IN AN ORGANIZED HEALTH CARE EDUCATION/TRAINING PROGRAM

## 2025-07-13 PROCEDURE — 36415 COLL VENOUS BLD VENIPUNCTURE: CPT

## 2025-07-13 PROCEDURE — 85025 COMPLETE CBC W/AUTO DIFF WBC: CPT

## 2025-07-13 PROCEDURE — 6370000000 HC RX 637 (ALT 250 FOR IP)

## 2025-07-13 PROCEDURE — 80048 BASIC METABOLIC PNL TOTAL CA: CPT

## 2025-07-13 RX ORDER — POTASSIUM CHLORIDE 7.45 MG/ML
10 INJECTION INTRAVENOUS
Status: DISCONTINUED | OUTPATIENT
Start: 2025-07-13 | End: 2025-07-13

## 2025-07-13 RX ORDER — OXYCODONE HYDROCHLORIDE 5 MG/1
10 TABLET ORAL EVERY 4 HOURS PRN
Refills: 0 | Status: DISCONTINUED | OUTPATIENT
Start: 2025-07-13 | End: 2025-07-13 | Stop reason: HOSPADM

## 2025-07-13 RX ADMIN — HYDROMORPHONE HYDROCHLORIDE 0.25 MG: 1 INJECTION, SOLUTION INTRAMUSCULAR; INTRAVENOUS; SUBCUTANEOUS at 04:53

## 2025-07-13 RX ADMIN — RIVAROXABAN 10 MG: 10 TABLET, FILM COATED ORAL at 08:13

## 2025-07-13 RX ADMIN — OXYCODONE HYDROCHLORIDE 10 MG: 5 TABLET ORAL at 10:16

## 2025-07-13 RX ADMIN — EMPAGLIFLOZIN 25 MG: 10 TABLET, FILM COATED ORAL at 08:14

## 2025-07-13 RX ADMIN — POTASSIUM CHLORIDE 40 MEQ: 1500 TABLET, EXTENDED RELEASE ORAL at 08:13

## 2025-07-13 RX ADMIN — HYDROMORPHONE HYDROCHLORIDE 0.25 MG: 1 INJECTION, SOLUTION INTRAMUSCULAR; INTRAVENOUS; SUBCUTANEOUS at 00:26

## 2025-07-13 RX ADMIN — FLUOXETINE HYDROCHLORIDE 40 MG: 20 CAPSULE ORAL at 08:13

## 2025-07-13 RX ADMIN — OXYCODONE HYDROCHLORIDE 10 MG: 5 TABLET ORAL at 17:27

## 2025-07-13 ASSESSMENT — PAIN SCALES - GENERAL
PAINLEVEL_OUTOF10: 5
PAINLEVEL_OUTOF10: 3
PAINLEVEL_OUTOF10: 7
PAINLEVEL_OUTOF10: 5

## 2025-07-13 ASSESSMENT — PAIN DESCRIPTION - LOCATION
LOCATION: HEAD;ABDOMEN
LOCATION: ABDOMEN

## 2025-07-13 NOTE — DISCHARGE INSTR - COC
Continuity of Care Form    Patient Name: Angelica Guzman   :  1950  MRN:  [id number]    Admit date:  2025  Discharge date:  ***    Code Status Order: Full Code   Advance Directives:     Admitting Physician:  Gigi Aguirre MD  PCP: Catarino Luciano MD    Discharging Nurse: ***  Discharging Hospital Unit/Room#: 308/308-01  Discharging Unit Phone Number: ***    Emergency Contact:   Extended Emergency Contact Information  Primary Emergency Contact: Sis Merida  Home Phone: [phone]  Relation: Child  Secondary Emergency Contact: Edgard Caicedo  [facility] Phone: [phone]  Relation: None    Past Surgical History:  Past Surgical History:   Procedure Laterality Date    HYSTERECTOMY (CERVIX STATUS UNKNOWN)      MASTECTOMY Left 2019       Immunization History:   Immunization History   Administered Date(s) Administered    COVID-19, PFIZER PURPLE top, DILUTE for use, (age 12 y+), 30mcg/0.3mL 2021, 2021, 2021    Influenza 10/01/2015    Influenza Virus Vaccine 2011, 2012, 2013, 10/14/2014, 2015, 2015, 2016    Influenza, FLUARIX, FLULAVAL, FLUZONE (age 6 mo+) and AFLURIA, (age 3 y+), Quadv PF, 0.5mL 10/29/2018    Influenza, FLUZONE High Dose (age 65 y+), IM, Quadv, 0.7mL 10/24/2022    Influenza, FLUZONE High Dose, (age 65 y+), IM, Trivalent PF, 0.5mL 2016, 2017, 2019    Pneumococcal, PCV-13, PREVNAR 13, (age 6w+), IM, 0.5mL 2016    Pneumococcal, PPSV23, PNEUMOVAX 23, (age 2y+), SC/IM, 0.5mL 2013, 2018    TDaP, ADACEL (age 10y-64y), BOOSTRIX (age 10y+), IM, 0.5mL 2016       Active Problems:  Patient Active Problem List   Diagnosis Code    Hypercholesterolemia E78.00    Mood disorder F39    Arthritis M19.90    Essential hypertension I10    Malignant neoplasm of right female breast (HCC) C50.911    Recurrent major depressive disorder, in full remission F33.42    Type 2 diabetes mellitus with hypoglycemia without coma,

## 2025-07-13 NOTE — PLAN OF CARE
Problem: Chronic Conditions and Co-morbidities  Goal: Patient's chronic conditions and co-morbidity symptoms are monitored and maintained or improved  7/10/2025 0645 by Elsa Pizano RN  Outcome: Progressing  Flowsheets (Taken 7/9/2025 2045)  Care Plan - Patient's Chronic Conditions and Co-Morbidity Symptoms are Monitored and Maintained or Improved:   Collaborate with multidisciplinary team to address chronic and comorbid conditions and prevent exacerbation or deterioration   Monitor and assess patient's chronic conditions and comorbid symptoms for stability, deterioration, or improvement   Update acute care plan with appropriate goals if chronic or comorbid symptoms are exacerbated and prevent overall improvement and discharge     Problem: Discharge Planning  Goal: Discharge to home or other facility with appropriate resources  7/10/2025 0645 by Elsa Pizano RN  Outcome: Progressing  Flowsheets (Taken 7/9/2025 2045)  Discharge to home or other facility with appropriate resources: Identify barriers to discharge with patient and caregiver     Problem: Pain  Goal: Verbalizes/displays adequate comfort level or baseline comfort level  7/10/2025 0645 by Elsa Pizano RN  Outcome: Progressing  Flowsheets (Taken 7/10/2025 0453)  Verbalizes/displays adequate comfort level or baseline comfort level:   Encourage patient to monitor pain and request assistance   Assess pain using appropriate pain scale   Administer analgesics based on type and severity of pain and evaluate response   Implement non-pharmacological measures as appropriate and evaluate response     
  Problem: Chronic Conditions and Co-morbidities  Goal: Patient's chronic conditions and co-morbidity symptoms are monitored and maintained or improved  7/11/2025 0130 by Elsa Pizano RN  Outcome: Progressing  Flowsheets (Taken 7/10/2025 2030)  Care Plan - Patient's Chronic Conditions and Co-Morbidity Symptoms are Monitored and Maintained or Improved:   Monitor and assess patient's chronic conditions and comorbid symptoms for stability, deterioration, or improvement   Collaborate with multidisciplinary team to address chronic and comorbid conditions and prevent exacerbation or deterioration   Update acute care plan with appropriate goals if chronic or comorbid symptoms are exacerbated and prevent overall improvement and discharge     Problem: Discharge Planning  Goal: Discharge to home or other facility with appropriate resources  7/11/2025 0130 by Elsa Pizano RN  Outcome: Progressing  Flowsheets (Taken 7/10/2025 2030)  Discharge to home or other facility with appropriate resources: Identify barriers to discharge with patient and caregiver     Problem: Pain  Goal: Verbalizes/displays adequate comfort level or baseline comfort level  7/11/2025 0130 by Elsa Pizano RN  Outcome: Progressing  Flowsheets (Taken 7/10/2025 2332)  Verbalizes/displays adequate comfort level or baseline comfort level:   Encourage patient to monitor pain and request assistance   Assess pain using appropriate pain scale   Administer analgesics based on type and severity of pain and evaluate response   Implement non-pharmacological measures as appropriate and evaluate response     Problem: Gastrointestinal - Adult  Goal: Maintains or returns to baseline bowel function  7/11/2025 0130 by Elsa Pizano RN  Outcome: Progressing  Flowsheets (Taken 7/10/2025 2030)  Maintains or returns to baseline bowel function:   Assess bowel function   Administer IV fluids as ordered to ensure adequate hydration   Administer ordered medications as needed   
  Problem: Chronic Conditions and Co-morbidities  Goal: Patient's chronic conditions and co-morbidity symptoms are monitored and maintained or improved  Outcome: Progressing     Problem: Discharge Planning  Goal: Discharge to home or other facility with appropriate resources  Outcome: Progressing     Problem: Pain  Goal: Verbalizes/displays adequate comfort level or baseline comfort level  Outcome: Progressing     
  Problem: Chronic Conditions and Co-morbidities  Goal: Patient's chronic conditions and co-morbidity symptoms are monitored and maintained or improved  Outcome: Progressing     Problem: Discharge Planning  Goal: Discharge to home or other facility with appropriate resources  Outcome: Progressing  Flowsheets (Taken 7/12/2025 1948)  Discharge to home or other facility with appropriate resources:   Identify barriers to discharge with patient and caregiver   Arrange for needed discharge resources and transportation as appropriate   Identify discharge learning needs (meds, wound care, etc)   Refer to discharge planning if patient needs post-hospital services based on physician order or complex needs related to functional status, cognitive ability or social support system     Problem: Pain  Goal: Verbalizes/displays adequate comfort level or baseline comfort level  Outcome: Progressing     Problem: Gastrointestinal - Adult  Goal: Maintains or returns to baseline bowel function  Outcome: Progressing  Flowsheets (Taken 7/12/2025 1948)  Maintains or returns to baseline bowel function:   Assess bowel function   Encourage oral fluids to ensure adequate hydration   Administer IV fluids as ordered to ensure adequate hydration   Administer ordered medications as needed   Encourage mobilization and activity   Nutrition consult to assist patient with appropriate food choices     Problem: Metabolic/Fluid and Electrolytes - Adult  Goal: Electrolytes maintained within normal limits  Outcome: Progressing     Problem: Coping  Goal: Pt/Family able to verbalize concerns and demonstrate effective coping strategies  Description: INTERVENTIONS:  1. Assist patient/family to identify coping skills, available support systems and cultural and spiritual values  2. Provide emotional support, including active listening and acknowledgement of concerns of patient and caregivers  3. Reduce environmental stimuli, as able  4. Instruct patient/family in 
  Problem: Chronic Conditions and Co-morbidities  Goal: Patient's chronic conditions and co-morbidity symptoms are monitored and maintained or improved  Outcome: Progressing  Flowsheets (Taken 7/11/2025 0800)  Care Plan - Patient's Chronic Conditions and Co-Morbidity Symptoms are Monitored and Maintained or Improved:   Monitor and assess patient's chronic conditions and comorbid symptoms for stability, deterioration, or improvement   Collaborate with multidisciplinary team to address chronic and comorbid conditions and prevent exacerbation or deterioration   Update acute care plan with appropriate goals if chronic or comorbid symptoms are exacerbated and prevent overall improvement and discharge     Problem: Discharge Planning  Goal: Discharge to home or other facility with appropriate resources  Outcome: Progressing  Flowsheets (Taken 7/11/2025 0800)  Discharge to home or other facility with appropriate resources:   Identify barriers to discharge with patient and caregiver   Arrange for needed discharge resources and transportation as appropriate   Identify discharge learning needs (meds, wound care, etc)     Problem: Pain  Goal: Verbalizes/displays adequate comfort level or baseline comfort level  Outcome: Progressing  Flowsheets (Taken 7/11/2025 1721)  Verbalizes/displays adequate comfort level or baseline comfort level:   Encourage patient to monitor pain and request assistance   Assess pain using appropriate pain scale   Administer analgesics based on type and severity of pain and evaluate response     Problem: Gastrointestinal - Adult  Goal: Maintains or returns to baseline bowel function  Outcome: Progressing  Flowsheets (Taken 7/11/2025 0800)  Maintains or returns to baseline bowel function:   Assess bowel function   Administer IV fluids as ordered to ensure adequate hydration   Administer ordered medications as needed   Encourage mobilization and activity     Problem: Metabolic/Fluid and Electrolytes - 
  Problem: Chronic Conditions and Co-morbidities  Goal: Patient's chronic conditions and co-morbidity symptoms are monitored and maintained or improved  Outcome: Progressing  Flowsheets (Taken 7/13/2025 0800)  Care Plan - Patient's Chronic Conditions and Co-Morbidity Symptoms are Monitored and Maintained or Improved: Monitor and assess patient's chronic conditions and comorbid symptoms for stability, deterioration, or improvement     Problem: Discharge Planning  Goal: Discharge to home or other facility with appropriate resources  Outcome: Progressing  Flowsheets (Taken 7/13/2025 0800)  Discharge to home or other facility with appropriate resources: Identify barriers to discharge with patient and caregiver     Problem: Pain  Goal: Verbalizes/displays adequate comfort level or baseline comfort level  Outcome: Progressing     Problem: Gastrointestinal - Adult  Goal: Maintains or returns to baseline bowel function  Outcome: Progressing  Flowsheets (Taken 7/13/2025 0800)  Maintains or returns to baseline bowel function: Assess bowel function     Problem: Metabolic/Fluid and Electrolytes - Adult  Goal: Electrolytes maintained within normal limits  Outcome: Progressing  Flowsheets (Taken 7/13/2025 0800)  Electrolytes maintained within normal limits: Monitor labs and assess patient for signs and symptoms of electrolyte imbalances     Problem: Coping  Goal: Pt/Family able to verbalize concerns and demonstrate effective coping strategies  Description: INTERVENTIONS:  1. Assist patient/family to identify coping skills, available support systems and cultural and spiritual values  2. Provide emotional support, including active listening and acknowledgement of concerns of patient and caregivers  3. Reduce environmental stimuli, as able  4. Instruct patient/family in relaxation techniques, as appropriate  5. Assess for spiritual pain/suffering and initiate Spiritual Care, Psychosocial Clinical Specialist consults as 
2009  Discharge to home or other facility with appropriate resources:   Identify barriers to discharge with patient and caregiver   Arrange for needed discharge resources and transportation as appropriate   Identify discharge learning needs (meds, wound care, etc)   Refer to discharge planning if patient needs post-hospital services based on physician order or complex needs related to functional status, cognitive ability or social support system     Problem: Pain  Goal: Verbalizes/displays adequate comfort level or baseline comfort level  7/12/2025 0217 by Sveta Samuels RN  Outcome: Progressing  Flowsheets (Taken 7/11/2025 2027)  Verbalizes/displays adequate comfort level or baseline comfort level:   Encourage patient to monitor pain and request assistance   Assess pain using appropriate pain scale   Administer analgesics based on type and severity of pain and evaluate response   Implement non-pharmacological measures as appropriate and evaluate response   Consider cultural and social influences on pain and pain management   Notify Licensed Independent Practitioner if interventions unsuccessful or patient reports new pain     
Identify barriers to discharge with patient and caregiver     Problem: Pain  Goal: Verbalizes/displays adequate comfort level or baseline comfort level  7/10/2025 1358 by Taylor Westbrook, RN  Outcome: Progressing  Flowsheets (Taken 7/10/2025 1130)  Verbalizes/displays adequate comfort level or baseline comfort level:   Encourage patient to monitor pain and request assistance   Assess pain using appropriate pain scale   Administer analgesics based on type and severity of pain and evaluate response   Implement non-pharmacological measures as appropriate and evaluate response  7/10/2025 0645 by Elsa Pizano, RN  Outcome: Progressing  Flowsheets (Taken 7/10/2025 0453)  Verbalizes/displays adequate comfort level or baseline comfort level:   Encourage patient to monitor pain and request assistance   Assess pain using appropriate pain scale   Administer analgesics based on type and severity of pain and evaluate response   Implement non-pharmacological measures as appropriate and evaluate response     Problem: Gastrointestinal - Adult  Goal: Maintains or returns to baseline bowel function  Outcome: Progressing  Flowsheets (Taken 7/10/2025 0800)  Maintains or returns to baseline bowel function:   Assess bowel function   Encourage oral fluids to ensure adequate hydration   Administer IV fluids as ordered to ensure adequate hydration     Problem: Metabolic/Fluid and Electrolytes - Adult  Goal: Electrolytes maintained within normal limits  Outcome: Progressing  Flowsheets (Taken 7/10/2025 0800)  Electrolytes maintained within normal limits:   Monitor labs and assess patient for signs and symptoms of electrolyte imbalances   Administer electrolyte replacement as ordered   Monitor response to electrolyte replacements, including repeat lab results as appropriate

## 2025-07-13 NOTE — FLOWSHEET NOTE
07/13/25 1824   AVS Reviewed   AVS & discharge instructions reviewed with patient and/or representative? Yes   Reviewed instructions with Patient   Level of Understanding Questions answered;Teach back completed;Verbalized understanding     Pt discharged home. IV removed. All instructions reviewed with patient at bedside. Patient states they understand the discharge instructions.

## 2025-07-13 NOTE — DISCHARGE INSTR - DIET

## 2025-07-13 NOTE — DISCHARGE INSTRUCTIONS
Date of admission: 7/9/2025  Date of discharge: 07/13/25    Your care was provided by the attending and resident physicians of the [facility]. The encounter diagnosis was Small bowel obstruction (HCC).    FOLLOW-UP  - Follow up with your primary care physician Catarino Luciano in 3 days.  - Follow up with your general surgeon in 2  weeks.    MEDICATIONS  - Continue taking your other home medications as directed.    ADDITIONAL INSTRUCTIONS  - Please return immediately to the [facility] Emergency Department if you experience small bowel obstruction symptoms again such as abdominal pain, vomiting, excessive nausea, inability tolerate foods or fluids, or if you experience crushing chest pain, difficulty breathing or shortness of breath, severe headache, or inability to care for yourself.

## 2025-07-13 NOTE — PROGRESS NOTES
[facility]  Inpatient Service     Progress Note  2025    7:32 AM    Name:   Angelica Guzman  MRN:     [id number]     Acct:      [id number]   Room:   [address] [place] [address] Day:  4  Admit Date:  2025 10:01 AM    PCP:   Catarino Luciano MD  Code Status:  Full Code    Subjective:     Overnight events: No significant overnight events.    Pt was examined at bedside.  She denied nausea, vomiting, shortness of breath, chest pain.  She admitted to being hungry and wanted food today.     Medications:     Allergies:    Allergies   Allergen Reactions    Morphine     Atorvastatin        Current Meds:   Scheduled Meds:    FLUoxetine  40 mg Oral Daily    potassium chloride  10 mEq IntraVENous Q1H    empagliflozin  25 mg Oral Daily    sodium chloride  80 mL IntraVENous Once    [Held by provider] FLUoxetine  40 mg Oral Daily    [Held by provider] imatinib  300 mg Oral BID    rivaroxaban  10 mg Oral Daily with breakfast    sodium chloride flush  5-40 mL IntraVENous 2 times per day    insulin lispro  0-4 Units SubCUTAneous 4x Daily AC & HS     Continuous Infusions:    dextrose 5 % and 0.9 % NaCl 50 mL/hr at 25 0106    sodium chloride      dextrose       PRN Meds: oxyCODONE, sodium chloride flush, sodium chloride flush, sodium chloride, potassium chloride **OR** potassium alternative oral replacement **OR** potassium chloride, magnesium sulfate, ondansetron **OR** ondansetron, polyethylene glycol, acetaminophen **OR** acetaminophen, glucose, dextrose bolus **OR** dextrose bolus, glucagon (rDNA), dextrose, ketorolac, HYDROmorphone **OR** HYDROmorphone, phenol    ROS:   ROS is negative except for points noted above.    Data:     Vitals:  /72   Pulse 62   Temp 98.6 °F (37 °C) (Oral)   Resp 17   Ht 1.65 m (5' 4.96\")   Wt 79 kg (174 lb 2.6 oz)   SpO2 96%   BMI 29.02 kg/m²   Temp (24hrs), Av.5 °F (36.9 °C), Min:98.1 °F (36.7 °C), Max:99.1 °F (37.3

## 2025-07-14 NOTE — DISCHARGE SUMMARY
[facility]  Inpatient Service    Discharge Summary     Patient ID: Angelica Guzman  :  1950   MRN: [id number]     ACCOUNT:  [id number]   Patient's PCP: Catarino Luciano MD  Admit Date: 2025   Discharge Date: 2025  Length of Stay: 4  Code Status:  Prior  Admitting Physician: Gigi Aguirre MD  Discharge Physician: Brenda Bang DO    Active Discharge Diagnoses:     Hospital Problem Lists:  Principal Problem:    Small bowel obstruction (HCC)  Active Problems:    Malignant gastrointestinal stromal tumor (HCC)    Hypercholesterolemia    Mood disorder    Essential hypertension    Recurrent major depressive disorder, in full remission    Type 2 diabetes mellitus with hypoglycemia without coma, without long-term current use of insulin (HCC)    Partial small bowel obstruction (HCC)    Nonsmoker  Resolved Problems:    SBO (small bowel obstruction) (HCC)    Admission Condition:  fair     Discharged Condition: stable    Hospital Stay:     Hospital Course:    Angelica Guzman is a 74 y.o. female with past medical history of breast cancer status post mastectomy, advanced metastatic GIST tumor who follows with hematology/oncology, type II diabetes mellitus not on insulin, and depression presented to the ED for evaluation of abdominal pain and nausea.     In the course of the ED, the patient's vitals were 97.9F, pulse 74, respirations 18, /61.  No leukocytosis WBC 8.8, Lactic acid 2.3, lipase 23. CT abdomen/pelvis revealed small-bowel obstruction with abrupt caliber change at the level of the unchanged 19.7cm left abdominal mass. Patient was admitted to inpatient service for management of her pain and decompression with NG tube. General surgery and hematology/oncology were consulted.      In the course of the hospital stay, the patient was treated with an NG tube and dilaudid for her pain. She was followed by Dr. Robles (Heme/Onc) and Dr. Baker